# Patient Record
Sex: MALE | Race: WHITE | Employment: FULL TIME | URBAN - METROPOLITAN AREA
[De-identification: names, ages, dates, MRNs, and addresses within clinical notes are randomized per-mention and may not be internally consistent; named-entity substitution may affect disease eponyms.]

---

## 2017-05-25 ENCOUNTER — EMPLOYEE WELLNESS (OUTPATIENT)
Dept: OTHER | Age: 42
End: 2017-05-25

## 2017-05-25 LAB
CHOLESTEROL/HDL RATIO: 3.1
CHOLESTEROL: 172 MG/DL
GLUCOSE BLD-MCNC: 91 MG/DL (ref 70–99)
HDLC SERPL-MCNC: 55 MG/DL
LDL CHOLESTEROL: 108 MG/DL (ref 0–130)
PATIENT FASTING?: YES
TRIGL SERPL-MCNC: 44 MG/DL
VLDLC SERPL CALC-MCNC: NORMAL MG/DL (ref 1–30)

## 2018-01-12 ENCOUNTER — OFFICE VISIT (OUTPATIENT)
Dept: FAMILY MEDICINE CLINIC | Age: 43
End: 2018-01-12
Payer: COMMERCIAL

## 2018-01-12 VITALS
SYSTOLIC BLOOD PRESSURE: 110 MMHG | WEIGHT: 184.1 LBS | DIASTOLIC BLOOD PRESSURE: 74 MMHG | HEART RATE: 74 BPM | BODY MASS INDEX: 27.9 KG/M2 | OXYGEN SATURATION: 98 % | HEIGHT: 68 IN | TEMPERATURE: 98.6 F

## 2018-01-12 DIAGNOSIS — Z00.00 ANNUAL PHYSICAL EXAM: Primary | ICD-10-CM

## 2018-01-12 DIAGNOSIS — R41.840 POOR CONCENTRATION: ICD-10-CM

## 2018-01-12 DIAGNOSIS — E78.5 HYPERLIPIDEMIA, UNSPECIFIED HYPERLIPIDEMIA TYPE: ICD-10-CM

## 2018-01-12 DIAGNOSIS — Z11.4 ENCOUNTER FOR SCREENING FOR HIV: ICD-10-CM

## 2018-01-12 PROCEDURE — 99396 PREV VISIT EST AGE 40-64: CPT | Performed by: PHYSICIAN ASSISTANT

## 2018-01-12 ASSESSMENT — ENCOUNTER SYMPTOMS
CHEST TIGHTNESS: 0
EYE DISCHARGE: 0
SINUS PRESSURE: 0
COUGH: 0
ABDOMINAL PAIN: 0
VOMITING: 0
SHORTNESS OF BREATH: 0
NAUSEA: 0
DIARRHEA: 0
RHINORRHEA: 0
SORE THROAT: 0
EYE ITCHING: 0

## 2018-01-12 ASSESSMENT — PATIENT HEALTH QUESTIONNAIRE - PHQ9
2. FEELING DOWN, DEPRESSED OR HOPELESS: 1
SUM OF ALL RESPONSES TO PHQ QUESTIONS 1-9: 2
1. LITTLE INTEREST OR PLEASURE IN DOING THINGS: 1
SUM OF ALL RESPONSES TO PHQ9 QUESTIONS 1 & 2: 2

## 2018-01-12 NOTE — PROGRESS NOTES
[address]  [address] [place] [id number]  Dept: [phone]  Dept Fax: [phone]    Eli Calzada is a 40 y.o. male who presents today for his medical conditions/complaints as noted below. Eli Calzada is c/o of   Chief Complaint   Patient presents with    Hyperlipidemia      concerns-         HPI:     Hyperlipidemia   This is a chronic problem. He has no history of chronic renal disease, diabetes, hypothyroidism, liver disease or nephrotic syndrome. Pertinent negatives include no chest pain or shortness of breath. Mental Health Problem   The primary symptoms do not include dysphoric mood, delusions, hallucinations, bizarre behavior, disorganized speech, negative symptoms or somatic symptoms. Additional symptoms of the illness include attention impairment. Additional symptoms of the illness do not include anhedonia, insomnia, hypersomnia, appetite change, unexpected weight change, fatigue, agitation, psychomotor retardation, euphoric mood, headaches or abdominal pain. He does not admit to suicidal ideas. He does not have a plan to commit suicide. He does not contemplate harming himself. He has not already injured self. He does not contemplate injuring another person. He has not already  injured another person. No results found for: LABA1C          ( goal A1C is < 7)   No results found for: LABMICR  LDL Cholesterol (mg/dL)   Date Value   05/25/2017 108   06/12/2013 125 (H)       (goal LDL is <100)   No results found for: AST, ALT, BUN  BP Readings from Last 3 Encounters:   01/12/18 110/74   08/22/16 132/87   08/08/16 118/70          (goal 120/80)    History reviewed. No pertinent past medical history. Past Surgical History:   Procedure Laterality Date    VASECTOMY         History reviewed. No pertinent family history.     Social History   Substance Use Topics    Smoking status: Former Smoker    Smokeless tobacco: Former User    Alcohol use Yes Miguelina Roberto PA-C on 1/12/2018 at 10:53 AM

## 2018-01-16 ENCOUNTER — HOSPITAL ENCOUNTER (OUTPATIENT)
Age: 43
Setting detail: SPECIMEN
Discharge: HOME | End: 2018-01-16
Payer: COMMERCIAL

## 2018-01-16 DIAGNOSIS — E78.5 HYPERLIPIDEMIA, UNSPECIFIED HYPERLIPIDEMIA TYPE: ICD-10-CM

## 2018-01-16 DIAGNOSIS — Z00.00 ANNUAL PHYSICAL EXAM: ICD-10-CM

## 2018-01-16 DIAGNOSIS — R41.840 POOR CONCENTRATION: ICD-10-CM

## 2018-01-16 DIAGNOSIS — Z11.4 ENCOUNTER FOR SCREENING FOR HIV: ICD-10-CM

## 2018-01-16 LAB
ABSOLUTE EOS #: 0.04 K/UL (ref 0–0.44)
ABSOLUTE IMMATURE GRANULOCYTE: 0.05 K/UL (ref 0–0.3)
ABSOLUTE LYMPH #: 1.66 K/UL (ref 1.1–3.7)
ABSOLUTE MONO #: 0.4 K/UL (ref 0.1–1.2)
ALBUMIN SERPL-MCNC: 5.1 G/DL (ref 3.5–5.2)
ALBUMIN/GLOBULIN RATIO: 1.9 (ref 1–2.5)
ALP BLD-CCNC: 71 U/L (ref 40–129)
ALT SERPL-CCNC: 19 U/L (ref 5–41)
ANION GAP SERPL CALCULATED.3IONS-SCNC: 19 MMOL/L (ref 9–17)
AST SERPL-CCNC: 23 U/L
BASOPHILS # BLD: 1 % (ref 0–2)
BASOPHILS ABSOLUTE: 0.05 K/UL (ref 0–0.2)
BILIRUB SERPL-MCNC: 0.7 MG/DL (ref 0.3–1.2)
BUN BLDV-MCNC: 20 MG/DL (ref 6–20)
BUN/CREAT BLD: ABNORMAL (ref 9–20)
CALCIUM SERPL-MCNC: 9.3 MG/DL (ref 8.6–10.4)
CHLORIDE BLD-SCNC: 100 MMOL/L (ref 98–107)
CHOLESTEROL/HDL RATIO: 3.9
CHOLESTEROL: 215 MG/DL
CO2: 25 MMOL/L (ref 20–31)
CREAT SERPL-MCNC: 1.06 MG/DL (ref 0.7–1.2)
DIFFERENTIAL TYPE: ABNORMAL
EOSINOPHILS RELATIVE PERCENT: 1 % (ref 1–4)
GFR AFRICAN AMERICAN: >60 ML/MIN
GFR NON-AFRICAN AMERICAN: >60 ML/MIN
GFR SERPL CREATININE-BSD FRML MDRD: ABNORMAL ML/MIN/{1.73_M2}
GFR SERPL CREATININE-BSD FRML MDRD: ABNORMAL ML/MIN/{1.73_M2}
GLUCOSE BLD-MCNC: 76 MG/DL (ref 70–99)
HCT VFR BLD CALC: 46.4 % (ref 40.7–50.3)
HDLC SERPL-MCNC: 55 MG/DL
HEMOGLOBIN: 15.5 G/DL (ref 13–17)
HIV AG/AB: NONREACTIVE
IMMATURE GRANULOCYTES: 1 %
LDL CHOLESTEROL: 149 MG/DL (ref 0–130)
LYMPHOCYTES # BLD: 26 % (ref 24–43)
MCH RBC QN AUTO: 30.3 PG (ref 25.2–33.5)
MCHC RBC AUTO-ENTMCNC: 33.4 G/DL (ref 28.4–34.8)
MCV RBC AUTO: 90.8 FL (ref 82.6–102.9)
MONOCYTES # BLD: 6 % (ref 3–12)
NRBC AUTOMATED: 0 PER 100 WBC
PDW BLD-RTO: 11.9 % (ref 11.8–14.4)
PLATELET # BLD: 250 K/UL (ref 138–453)
PLATELET ESTIMATE: ABNORMAL
PMV BLD AUTO: 11.3 FL (ref 8.1–13.5)
POTASSIUM SERPL-SCNC: 4.3 MMOL/L (ref 3.7–5.3)
RBC # BLD: 5.11 M/UL (ref 4.21–5.77)
RBC # BLD: ABNORMAL 10*6/UL
SEG NEUTROPHILS: 65 % (ref 36–65)
SEGMENTED NEUTROPHILS ABSOLUTE COUNT: 4.11 K/UL (ref 1.5–8.1)
SODIUM BLD-SCNC: 144 MMOL/L (ref 135–144)
TOTAL PROTEIN: 7.8 G/DL (ref 6.4–8.3)
TRIGL SERPL-MCNC: 57 MG/DL
TSH SERPL DL<=0.05 MIU/L-ACNC: 1.96 MIU/L (ref 0.3–5)
VITAMIN D 25-HYDROXY: 30 NG/ML (ref 30–100)
VLDLC SERPL CALC-MCNC: ABNORMAL MG/DL (ref 1–30)
WBC # BLD: 6.3 K/UL (ref 3.5–11.3)
WBC # BLD: ABNORMAL 10*3/UL

## 2018-03-14 ENCOUNTER — HOSPITAL ENCOUNTER (OUTPATIENT)
Age: 43
Discharge: HOME | End: 2018-03-14
Payer: COMMERCIAL

## 2018-03-14 ENCOUNTER — OFFICE VISIT (OUTPATIENT)
Dept: BEHAVIORAL/MENTAL HEALTH CLINIC | Age: 43
End: 2018-03-14
Payer: COMMERCIAL

## 2018-03-14 VITALS
BODY MASS INDEX: 27.98 KG/M2 | SYSTOLIC BLOOD PRESSURE: 133 MMHG | HEART RATE: 104 BPM | DIASTOLIC BLOOD PRESSURE: 91 MMHG | WEIGHT: 184 LBS

## 2018-03-14 DIAGNOSIS — F33.0 MAJOR DEPRESSIVE DISORDER, RECURRENT EPISODE, MILD (HCC): ICD-10-CM

## 2018-03-14 DIAGNOSIS — F33.0 MAJOR DEPRESSIVE DISORDER, RECURRENT EPISODE, MILD (HCC): Primary | ICD-10-CM

## 2018-03-14 DIAGNOSIS — F41.1 GENERALIZED ANXIETY DISORDER: ICD-10-CM

## 2018-03-14 DIAGNOSIS — F10.11 ALCOHOL USE DISORDER, MILD, IN SUSTAINED REMISSION, ABUSE: ICD-10-CM

## 2018-03-14 LAB
AMPHETAMINE SCREEN URINE: NEGATIVE
BARBITURATE SCREEN URINE: NEGATIVE
BENZODIAZEPINE SCREEN, URINE: NEGATIVE
BUPRENORPHINE URINE: NORMAL
CANNABINOID SCREEN URINE: NEGATIVE
COCAINE METABOLITE, URINE: NEGATIVE
MDMA URINE: NORMAL
METHADONE SCREEN, URINE: NEGATIVE
METHAMPHETAMINE, URINE: NORMAL
OPIATES, URINE: NEGATIVE
OXYCODONE SCREEN URINE: NEGATIVE
PHENCYCLIDINE, URINE: NEGATIVE
PROPOXYPHENE, URINE: NORMAL
TEST INFORMATION: NORMAL
TRICYCLIC ANTIDEPRESSANTS, UR: NORMAL

## 2018-03-14 PROCEDURE — 80307 DRUG TEST PRSMV CHEM ANLYZR: CPT

## 2018-03-14 PROCEDURE — 90792 PSYCH DIAG EVAL W/MED SRVCS: CPT | Performed by: PSYCHIATRY & NEUROLOGY

## 2018-03-14 RX ORDER — MIRTAZAPINE 7.5 MG/1
7.5 TABLET, FILM COATED ORAL NIGHTLY
Qty: 30 TABLET | Refills: 0 | Status: SHIPPED | OUTPATIENT
Start: 2018-03-14 | End: 2018-03-28 | Stop reason: SDUPTHER

## 2018-03-14 ASSESSMENT — ENCOUNTER SYMPTOMS
EYES NEGATIVE: 1
RESPIRATORY NEGATIVE: 1
GASTROINTESTINAL NEGATIVE: 1

## 2018-03-14 NOTE — PROGRESS NOTES
Near to/above Desirable   130-159   Borderline      >159   Undesirable     Direct (measured) LDL and calculated LDL are not interchangeable tests. Chol/HDL Ratio   Date Value Ref Range Status   01/16/2018 3.9 <5 Final     Comment:              Triglycerides   Date Value Ref Range Status   01/16/2018 57 <150 mg/dL Final     Comment:        Triglyceride Guidelines:     <150   Desirable   150-199  Borderline   200-499  High     >499   Very high   Based on AHA Guidelines for fasting triglyceride, October 2012.      [employer] [address] [facility], [address] [place] [address] [phone]       VLDL   Date Value Ref Range Status   01/16/2018 NOT REPORTED 1 - 30 mg/dL Final       CBC with Differential:  WBC   Date Value Ref Range Status   01/16/2018 6.3 3.5 - 11.3 k/uL Final     RBC   Date Value Ref Range Status   01/16/2018 5.11 4.21 - 5.77 m/uL Final     Hemoglobin   Date Value Ref Range Status   01/16/2018 15.5 13.0 - 17.0 g/dL Final     Hematocrit   Date Value Ref Range Status   01/16/2018 46.4 40.7 - 50.3 % Final     MCV   Date Value Ref Range Status   01/16/2018 90.8 82.6 - 102.9 fL Final     MCH   Date Value Ref Range Status   01/16/2018 30.3 25.2 - 33.5 pg Final     MCHC   Date Value Ref Range Status   01/16/2018 33.4 28.4 - 34.8 g/dL Final     RDW   Date Value Ref Range Status   01/16/2018 11.9 11.8 - 14.4 % Final     Platelets   Date Value Ref Range Status   01/16/2018 250 138 - 453 k/uL Final     MPV   Date Value Ref Range Status   01/16/2018 11.3 8.1 - 13.5 fL Final     Differential Type   Date Value Ref Range Status   01/16/2018 NOT REPORTED  Final     Seg Neutrophils   Date Value Ref Range Status   01/16/2018 65 36 - 65 % Final     Lymphocytes   Date Value Ref Range Status   01/16/2018 26 24 - 43 % Final     Monocytes   Date Value Ref Range Status   01/16/2018 6 3 - 12 % Final     Eosinophils %   Date Value Ref Range Status   01/16/2018 1 1 - 4 % Final     Basophils   Date Value Ref Range Status   01/16/2018 1 screen today. Benefit/risk of no trt were discussed. 50% time spent in coordinating care / counseling. Support provided during this appointment. Recommended individual therapy. Patient may consider in future. His wife is a counselor and she is supportive of him. Return in about 2 weeks (around 3/28/2018). as scheduled, and as needed. In case of emergency advised pt to call local ER/Rescue Crisis. F/U with HENRIQUE Horton/MELIA as needed. Discussed less calorie and less fat diet and exercise as tolerated like walking. Discussed the adverse effects of alcohol and other street drugs along with the psychotropics. Discussed the SE of nicotine and caffeine in sleep disturbance and anxiety.     Electronically signed by Abby Daniels MD on 3/14/2018 at 1:33 PM

## 2018-03-20 VITALS — BODY MASS INDEX: 26.46 KG/M2 | WEIGHT: 174 LBS

## 2018-03-28 ENCOUNTER — OFFICE VISIT (OUTPATIENT)
Dept: BEHAVIORAL/MENTAL HEALTH CLINIC | Age: 43
End: 2018-03-28
Payer: COMMERCIAL

## 2018-03-28 VITALS
SYSTOLIC BLOOD PRESSURE: 125 MMHG | WEIGHT: 188 LBS | BODY MASS INDEX: 28.59 KG/M2 | DIASTOLIC BLOOD PRESSURE: 79 MMHG | HEART RATE: 93 BPM

## 2018-03-28 DIAGNOSIS — F33.0 MAJOR DEPRESSIVE DISORDER, RECURRENT EPISODE, MILD (HCC): Primary | ICD-10-CM

## 2018-03-28 DIAGNOSIS — F41.1 GENERALIZED ANXIETY DISORDER: ICD-10-CM

## 2018-03-28 DIAGNOSIS — F10.11 MILD ALCOHOL ABUSE IN SUSTAINED REMISSION: ICD-10-CM

## 2018-03-28 PROCEDURE — 99214 OFFICE O/P EST MOD 30 MIN: CPT | Performed by: PSYCHIATRY & NEUROLOGY

## 2018-03-28 RX ORDER — MIRTAZAPINE 7.5 MG/1
7.5 TABLET, FILM COATED ORAL NIGHTLY
Qty: 30 TABLET | Refills: 0 | Status: SHIPPED | OUTPATIENT
Start: 2018-03-28 | End: 2018-05-09 | Stop reason: SDUPTHER

## 2018-03-28 ASSESSMENT — ENCOUNTER SYMPTOMS
RESPIRATORY NEGATIVE: 1
EYES NEGATIVE: 1
GASTROINTESTINAL NEGATIVE: 1

## 2018-05-16 ENCOUNTER — OFFICE VISIT (OUTPATIENT)
Dept: BEHAVIORAL/MENTAL HEALTH CLINIC | Age: 43
End: 2018-05-16
Payer: COMMERCIAL

## 2018-05-16 VITALS
WEIGHT: 186 LBS | RESPIRATION RATE: 17 BRPM | SYSTOLIC BLOOD PRESSURE: 109 MMHG | HEART RATE: 86 BPM | BODY MASS INDEX: 28.28 KG/M2 | DIASTOLIC BLOOD PRESSURE: 74 MMHG

## 2018-05-16 DIAGNOSIS — F10.11 MILD ALCOHOL ABUSE IN SUSTAINED REMISSION: ICD-10-CM

## 2018-05-16 DIAGNOSIS — F33.0 MAJOR DEPRESSIVE DISORDER, RECURRENT EPISODE, MILD (HCC): Primary | ICD-10-CM

## 2018-05-16 DIAGNOSIS — F41.1 GENERALIZED ANXIETY DISORDER: ICD-10-CM

## 2018-05-16 PROCEDURE — 99214 OFFICE O/P EST MOD 30 MIN: CPT | Performed by: PSYCHIATRY & NEUROLOGY

## 2018-05-16 RX ORDER — MIRTAZAPINE 15 MG/1
15 TABLET, FILM COATED ORAL NIGHTLY
Qty: 30 TABLET | Refills: 2 | Status: SHIPPED | OUTPATIENT
Start: 2018-05-16 | End: 2018-08-14 | Stop reason: SDUPTHER

## 2018-05-16 ASSESSMENT — ENCOUNTER SYMPTOMS
RESPIRATORY NEGATIVE: 1
GASTROINTESTINAL NEGATIVE: 1
EYES NEGATIVE: 1

## 2018-05-17 ENCOUNTER — EMPLOYEE WELLNESS (OUTPATIENT)
Dept: OTHER | Age: 43
End: 2018-05-17

## 2018-05-17 LAB
CHOLESTEROL/HDL RATIO: 4.6
CHOLESTEROL: 185 MG/DL
GLUCOSE BLD-MCNC: 91 MG/DL (ref 70–99)
HDLC SERPL-MCNC: 40 MG/DL
LDL CHOLESTEROL: 119 MG/DL (ref 0–130)
PATIENT FASTING?: YES
TRIGL SERPL-MCNC: 130 MG/DL
VLDLC SERPL CALC-MCNC: ABNORMAL MG/DL (ref 1–30)

## 2018-05-21 VITALS — WEIGHT: 185 LBS | BODY MASS INDEX: 28.13 KG/M2

## 2018-08-08 ENCOUNTER — OFFICE VISIT (OUTPATIENT)
Dept: BEHAVIORAL/MENTAL HEALTH CLINIC | Age: 43
End: 2018-08-08
Payer: COMMERCIAL

## 2018-08-08 DIAGNOSIS — F33.0 MAJOR DEPRESSIVE DISORDER, RECURRENT EPISODE, MILD (HCC): Primary | ICD-10-CM

## 2018-08-08 DIAGNOSIS — F41.1 GENERALIZED ANXIETY DISORDER: ICD-10-CM

## 2018-08-08 PROCEDURE — 90791 PSYCH DIAGNOSTIC EVALUATION: CPT | Performed by: PSYCHOLOGIST

## 2018-08-08 NOTE — PROGRESS NOTES
to change, Established rapport and [place]-setting to identify pt's primary goals for [facility] visit / overall health      Pt Behavioral Change Plan:   1. Please complete Patient Health Questionnaire and bring this with you to your next consultation. 2. If you need to reschedule or cancel please call [phone], option 3. If you need to speak directly to Dr. Cristóbal Arizmendi call [phone].  3. What is your guess about that \"normal\" word? What things ground \"normal\" people? 4. Ways to measure success/recovery:  A:  · Distress: How much does it bother me? · Intensity: How strong is the symptom? · Duration: How long does the symptom last?  · Frequency: How often does the symptom happen? · Dysfunction: How much does this symptom stop or decrease my ability to function? B: Another way is to ask yourself do I feel: better, worse or the same?   C: Another way is to ask yourself, was that healthy/unhealthy, effective/ineffective, safe/unsafe. 5. See handout on the process of change. Where have you struggle or stalled out in your process? 6. Follow up in 2 weeks. Diagnosis:  The primary encounter diagnosis was Major depressive disorder, recurrent episode, mild ([address] A diagnosis of Generalized anxiety disorder was also pertinent to this visit. No past medical history on file. History:    Medications:   Current Outpatient Prescriptions   Medication Sig Dispense Refill    mirtazapine (REMERON) 15 MG tablet Take 1 tablet by mouth nightly 30 tablet 2     No current facility-administered medications for this visit. Social History:   Social History     Social History    Marital status:      Spouse name: N/A    Number of children: N/A    Years of education: N/A     Occupational History    Not on file. Social History Main Topics    Smoking status: Former Smoker    Smokeless tobacco: Former User    Alcohol use Yes      Comment: decreased to 1 beer a wk; NICHOLAS--1 wk ago. No DUI / DT    Drug use:  No  Sexual activity: Not on file     Other Topics Concern    Not on file     Social History Narrative    No narrative on file       TOBACCO:   reports that he has quit smoking. He has quit using smokeless tobacco.  ETOH:   reports that he drinks alcohol. Family History:   No family history on file.

## 2018-08-08 NOTE — PATIENT INSTRUCTIONS
1. Please complete Patient Health Questionnaire and bring this with you to your next consultation. 2. If you need to reschedule or cancel please call [phone], option 3. If you need to speak directly to Dr. Frantz Eng call [phone].  3. What is your guess about that \"normal\" word? What things ground \"normal\" people? 4. Ways to measure success/recovery:  A:  · Distress: How much does it bother me? · Intensity: How strong is the symptom? · Duration: How long does the symptom last?  · Frequency: How often does the symptom happen? · Dysfunction: How much does this symptom stop or decrease my ability to function? B: Another way is to ask yourself do I feel: better, worse or the same?   C: Another way is to ask yourself, was that healthy/unhealthy, effective/ineffective, safe/unsafe. 5. See handout on the process of change. Where have you struggle or stalled out in your process? 6. Follow up in 2 weeks.

## 2018-08-14 ENCOUNTER — OFFICE VISIT (OUTPATIENT)
Dept: BEHAVIORAL/MENTAL HEALTH CLINIC | Age: 43
End: 2018-08-14
Payer: COMMERCIAL

## 2018-08-14 VITALS
SYSTOLIC BLOOD PRESSURE: 114 MMHG | RESPIRATION RATE: 18 BRPM | WEIGHT: 181 LBS | BODY MASS INDEX: 27.52 KG/M2 | HEART RATE: 88 BPM | DIASTOLIC BLOOD PRESSURE: 80 MMHG

## 2018-08-14 DIAGNOSIS — F41.1 GENERALIZED ANXIETY DISORDER: ICD-10-CM

## 2018-08-14 DIAGNOSIS — F10.11 MILD ALCOHOL ABUSE IN SUSTAINED REMISSION: ICD-10-CM

## 2018-08-14 DIAGNOSIS — F33.0 MAJOR DEPRESSIVE DISORDER, RECURRENT EPISODE, MILD (HCC): Primary | ICD-10-CM

## 2018-08-14 PROCEDURE — 99214 OFFICE O/P EST MOD 30 MIN: CPT | Performed by: PSYCHIATRY & NEUROLOGY

## 2018-08-14 RX ORDER — MIRTAZAPINE 15 MG/1
15 TABLET, FILM COATED ORAL NIGHTLY
Qty: 30 TABLET | Refills: 0 | Status: SHIPPED | OUTPATIENT
Start: 2018-08-14 | End: 2018-09-11 | Stop reason: SDUPTHER

## 2018-08-14 RX ORDER — BUPROPION HYDROCHLORIDE 100 MG/1
TABLET, EXTENDED RELEASE ORAL
Qty: 30 TABLET | Refills: 0 | Status: SHIPPED | OUTPATIENT
Start: 2018-08-14 | End: 2018-09-11 | Stop reason: SDUPTHER

## 2018-08-14 ASSESSMENT — ENCOUNTER SYMPTOMS
GASTROINTESTINAL NEGATIVE: 1
EYES NEGATIVE: 1
RESPIRATORY NEGATIVE: 1

## 2018-08-14 NOTE — PROGRESS NOTES
[address] Department of Psychiatry  Outpatient Services    Progress Note      Chief Complaint   Patient presents with    Discuss Medications         History obtained from: patient, medical record, outpatient clinical staff. Patient provided informed consent for the behavioral program.  Discussed the limits of confidentiality like abuse reporting, alcohol and substance abuse and suicide intervention etc.  Patient indicated understanding. Subjective:  Patient said Remeron is beneficial. He takes Remeron around 5 PM and feels drowsy about 2 hours later and if he takes late, he is not able to get up in the morning. Patient said he is a morning person and wakes up around 3:30 AM to go for jogging up to 8 miles per day. Patient has mild to moderate depression and anxiety due to ongoing life stresses. Maverickjennioksana Jaime He is remodeling his home to sell it and has financial limitation. Patient denied any sexual side effects. After taking Remeron, he noticed improvement in his mood symptoms. Pt has no mood swings   Pt sleeps 6 hours. He has had no difficulty in initiation/maintenance of sleep. He feels His  sleep is satisfactory. His appetite has been satisfactory. Patient denied any craving. He lost a few pounds. His family is supportive. He lives with His wife and 2 children. .  He has a full-time job. 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 He gets a long with:  [x] others  [x] family members  [x] co workers. Review of Systems:  Review of Systems   Constitutional: Negative. Eyes: Negative. Respiratory: Negative. Cardiovascular: Negative. Gastrointestinal: Negative. Genitourinary: Negative. Pt denied sexual side effects. Musculoskeletal: Negative. Skin: Negative. Neurological: Negative. Endo/Heme/Allergies: Negative. Psychiatric/Behavioral:        AS above. Allergies:  Patient has no known allergies.     AoD:  Social History   Substance Use Topics    Smoking status: Former Smoker    Smokeless tobacco: Former User    Alcohol use Yes      Comment: decreased to 1 beer/wk off and on ; NICHOLAS--12 days ago. No DUI / DT      History   Drug Use No         Caffeine:   Patient drinks 2 cups of regular coffee in the morning. Vitals:  Temp Readings from Last 1 Encounters:   01/12/18 98.6 °F (37 °C) (Oral)     Wt Readings from Last 1 Encounters:   08/14/18 181 lb (82.1 kg)     BP Readings from Last 1 Encounters:   08/14/18 114/80     Pulse Readings from Last 1 Encounters:   08/14/18 88     Estimated body mass index is 27.52 kg/m² as calculated from the following:    Height as of 1/12/18: 5' 8\" (1.727 m). Weight as of this encounter: 181 lb (82.1 kg). Mental Status Examination:    Level of consciousness:  Within normal limits  Appearance: Appropriately dressed for the weather, seated in chair, with good grooming, has a beard, overweight  Behavior: No abnormalities noted  Attitude toward examiner:  Cooperative, attentive, good eye contact  Speech:  spontaneous, normal rate, normal volume and well articulated  Mood: Depressed   Affect: Stable. Thought processes:  linear, goal directed and coherent  Thought content:  Pt denied suicidal and homicidal thoughts during this appt. Auditory hallucination:Pt denied auditory hallucination. Pt does not seem to respond to any internal stimuli such as hallucination during this appt. Delusions:  no evidence of delusions  Perceptual Disturbance:  denies any perceptual disturbance  Cognition:  Intact  Memory: age appropriate  Insight & Judgement: fair  Gait: Normal  Abnormal body movements: None    Today's Medications List:  Current Outpatient Prescriptions:     mirtazapine (REMERON) 15 MG tablet, Take 1 tablet by mouth nightly, Disp: 30 tablet, Rfl: 0    buPROPion (WELLBUTRIN SR) 100 MG extended release tablet, Take 1 tab in AM---Start, Disp: 30 tablet, Rfl: 0    Diagnoses DSM V    Visit Diagnoses:  1.  Major depressive disorder, [facility]    2. Generalized anxiety disorder    3. 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 Discussed other alternatives including Lexapro, Wellbutrin SR, Viibryd etc.  Patient agreed to try Wellbutrin SR. Recommended  to continue Remeron and start Wellbutrin  mg in AM.  Discussed Side effects off of Wellbutrin SR including anxiety, insomnia, decreased appetite, GI upset symptoms, decreased seizure threshold etc.    Also discussed the heat related illness in psychotropic medication users and advised him  to take precautions to avoid heatstroke and heat exhaustion. Benefit/risk of no trt were discussed . 50% time spent in coordinating care / counseling. Continue individual therapy with the Dr. Marlin Whipple, PhD at [facility]. Return in about 4 weeks (around 9/11/2018). as scheduled, and as needed. I told patient the outpatient psychiatric department will be closed in October 2018 and advised him to follow-up with his family physician, or private psychiatrist or at [facility] Like [place] Kasia., Ольга Tim and jonatan etc. A list of the referral sources was given to the patient. In case of emergency advised pt to call local ER/Rescue Crisis/BAC     F/U with HENRIQUE Posadas/MELIA as needed. Discussed the beneficial effects of exercise in mood and anxiety symptoms. Patient said he has been jogging routinely up to 8 miles per day. Discussed the adverse effects of alcohol and other street drugs along with the psychotropvoiced understanding of instructions given. I also told Wellbutrin SR along with alcohol can lower the seizure threshold. Discussed the SE of caffeine and nicotine in anxiety and sleep disturbance. .     Electronically signed by Los Bond MD on 8/14/2018 at 9:38 AM

## 2018-08-23 ENCOUNTER — HOSPITAL ENCOUNTER (OUTPATIENT)
Age: 43
Discharge: HOME | End: 2018-08-23

## 2018-08-23 LAB — HBV SURFACE AB TITR SER: <3.5 MIU/ML

## 2018-08-23 PROCEDURE — 86317 IMMUNOASSAY INFECTIOUS AGENT: CPT

## 2018-08-23 PROCEDURE — 36415 COLL VENOUS BLD VENIPUNCTURE: CPT

## 2018-09-11 ENCOUNTER — OFFICE VISIT (OUTPATIENT)
Dept: FAMILY MEDICINE CLINIC | Age: 43
End: 2018-09-11
Payer: COMMERCIAL

## 2018-09-11 VITALS
DIASTOLIC BLOOD PRESSURE: 82 MMHG | HEIGHT: 68 IN | WEIGHT: 179 LBS | SYSTOLIC BLOOD PRESSURE: 123 MMHG | TEMPERATURE: 98.3 F | HEART RATE: 83 BPM | BODY MASS INDEX: 27.13 KG/M2 | OXYGEN SATURATION: 98 %

## 2018-09-11 DIAGNOSIS — Z13.1 SCREENING FOR DIABETES MELLITUS: ICD-10-CM

## 2018-09-11 DIAGNOSIS — Z23 NEED FOR PROPHYLACTIC VACCINATION AGAINST STREPTOCOCCUS PNEUMONIAE (PNEUMOCOCCUS): ICD-10-CM

## 2018-09-11 DIAGNOSIS — F41.1 GENERALIZED ANXIETY DISORDER: ICD-10-CM

## 2018-09-11 DIAGNOSIS — R76.11 POSITIVE PPD: ICD-10-CM

## 2018-09-11 DIAGNOSIS — F33.0 MAJOR DEPRESSIVE DISORDER, RECURRENT EPISODE, MILD (HCC): Primary | ICD-10-CM

## 2018-09-11 DIAGNOSIS — E78.5 HYPERLIPIDEMIA, UNSPECIFIED HYPERLIPIDEMIA TYPE: ICD-10-CM

## 2018-09-11 DIAGNOSIS — Z13.29 SCREENING FOR THYROID DISORDER: ICD-10-CM

## 2018-09-11 PROCEDURE — 90471 IMMUNIZATION ADMIN: CPT | Performed by: PHYSICIAN ASSISTANT

## 2018-09-11 PROCEDURE — 99396 PREV VISIT EST AGE 40-64: CPT | Performed by: PHYSICIAN ASSISTANT

## 2018-09-11 PROCEDURE — 90732 PPSV23 VACC 2 YRS+ SUBQ/IM: CPT | Performed by: PHYSICIAN ASSISTANT

## 2018-09-11 RX ORDER — BUPROPION HYDROCHLORIDE 100 MG/1
TABLET, EXTENDED RELEASE ORAL
Qty: 90 TABLET | Refills: 1 | Status: SHIPPED | OUTPATIENT
Start: 2018-09-11 | End: 2018-12-19 | Stop reason: SDUPTHER

## 2018-09-11 RX ORDER — MIRTAZAPINE 15 MG/1
15 TABLET, FILM COATED ORAL NIGHTLY
Qty: 90 TABLET | Refills: 1 | Status: SHIPPED | OUTPATIENT
Start: 2018-09-11 | End: 2019-02-12 | Stop reason: SDUPTHER

## 2018-09-11 ASSESSMENT — ENCOUNTER SYMPTOMS
CHEST TIGHTNESS: 0
NAUSEA: 0
RHINORRHEA: 0
ABDOMINAL PAIN: 0
EYE ITCHING: 0
VOMITING: 0
EYE DISCHARGE: 0
SORE THROAT: 0
DIARRHEA: 0
SINUS PRESSURE: 0
SHORTNESS OF BREATH: 0
COUGH: 0

## 2018-09-11 NOTE — PROGRESS NOTES
from Last 3 Encounters:   09/11/18 123/82   08/14/18 114/80   05/16/18 109/74          (goal 120/80)    No past medical history on file. Past Surgical History:   Procedure Laterality Date    VASECTOMY         No family history on file. Social History   Substance Use Topics    Smoking status: Former Smoker    Smokeless tobacco: Former User    Alcohol use Yes      Comment: decreased to 1 beer/wk off and on ; NICHOLAS--12 days ago. No DUI / DT      Current Outpatient Prescriptions   Medication Sig Dispense Refill    mirtazapine (REMERON) 15 MG tablet Take 1 tablet by mouth nightly 90 tablet 1    buPROPion (WELLBUTRIN SR) 100 MG extended release tablet Take 1 tab in AM---Start 90 tablet 1     No current facility-administered medications for this visit. No Known Allergies    Health Maintenance   Topic Date Due    Flu vaccine (1) 09/01/2018    Lipid screen  05/17/2023    DTaP/Tdap/Td vaccine (2 - Td) 08/08/2026    Pneumococcal med risk  Completed    HIV screen  Completed       Subjective:      Review of Systems   Constitutional: Negative for appetite change, chills, diaphoresis, fatigue, fever and unexpected weight change. HENT: Negative for congestion, ear discharge, ear pain, postnasal drip, rhinorrhea, sinus pressure and sore throat. Eyes: Negative for discharge and itching. Respiratory: Negative for cough, chest tightness and shortness of breath. Cardiovascular: Negative for chest pain, palpitations and leg swelling. Gastrointestinal: Negative for abdominal pain, diarrhea, nausea and vomiting. Genitourinary: Negative for dysuria and frequency. Musculoskeletal: Negative for neck pain and neck stiffness. Skin: Negative for rash. Neurological: Negative for dizziness, focal weakness, weakness, light-headedness, numbness and headaches. Psychiatric/Behavioral: Negative for agitation, dysphoric mood and hallucinations. The patient does not have insomnia.     All other systems reviewed and STANDARD (2 VW)   4. Hyperlipidemia, unspecified hyperlipidemia type  Lipid Panel   5. BMI 27.0-27.9,adult  CBC Auto Differential    Comprehensive Metabolic Panel    TSH with Reflex   6. Generalized anxiety disorder  TSH with Reflex    Vitamin D 25 Hydroxy   7. Screening for diabetes mellitus  Comprehensive Metabolic Panel   8. Screening for thyroid disorder  TSH with Reflex             Plan:      No Follow-up on file. Orders Placed This Encounter   Procedures    XR CHEST STANDARD (2 VW)     Standing Status:   Future     Standing Expiration Date:   9/11/2019    Pneumococcal polysaccharide vaccine 23-valent PPSV23    CBC Auto Differential     Standing Status:   Future     Standing Expiration Date:   9/11/2019    Comprehensive Metabolic Panel     Standing Status:   Future     Standing Expiration Date:   9/11/2019    Lipid Panel     Standing Status:   Future     Standing Expiration Date:   9/11/2019     Order Specific Question:   Is Patient Fasting?/# of Hours     Answer:   yes    TSH with Reflex     Standing Status:   Future     Standing Expiration Date:   9/11/2019    Vitamin D 25 Hydroxy     Standing Status:   Future     Standing Expiration Date:   9/11/2019     Orders Placed This Encounter   Medications    mirtazapine (REMERON) 15 MG tablet     Sig: Take 1 tablet by mouth nightly     Dispense:  90 tablet     Refill:  1    buPROPion (WELLBUTRIN SR) 100 MG extended release tablet     Sig: Take 1 tab in AM---Start     Dispense:  90 tablet     Refill:  1        Anxiety / depression - Was seeing psych, however, psych is leaving. Currently taking wellbutrin and remeron. Tolerating very well. No SI or HI. Will take over meds. F/u q 6 mo or sooner for any mood change. Cont balanced diet and exercise. HLD - H/o. Cont diet and exercise. Labs yearly. Previous TB tx - needs yrly xray. Ordered. Balanced diet and routine exercise encouraged. Multivitamin with vitamin D daily encouraged.     Good

## 2018-09-18 ENCOUNTER — HOSPITAL ENCOUNTER (OUTPATIENT)
Dept: GENERAL RADIOLOGY | Age: 43
Discharge: HOME | End: 2018-09-20
Payer: COMMERCIAL

## 2018-09-18 ENCOUNTER — HOSPITAL ENCOUNTER (OUTPATIENT)
Age: 43
Discharge: HOME | End: 2018-09-20
Payer: COMMERCIAL

## 2018-09-18 DIAGNOSIS — R76.11 POSITIVE PPD: ICD-10-CM

## 2018-09-18 PROCEDURE — 71046 X-RAY EXAM CHEST 2 VIEWS: CPT

## 2018-12-19 RX ORDER — BUPROPION HYDROCHLORIDE 100 MG/1
TABLET, EXTENDED RELEASE ORAL
Qty: 90 TABLET | Refills: 1 | Status: SHIPPED | OUTPATIENT
Start: 2018-12-19 | End: 2019-04-04 | Stop reason: SDUPTHER

## 2018-12-19 NOTE — TELEPHONE ENCOUNTER
Pended to print for pt. Please Approve or Refuse.   Send to Pharmacy per Pt's Request:      Next Visit Date:  2/11/2019   Last Visit Date: 9/11/2018    No results found for: LABA1C          ( goal A1C is < 7)   BP Readings from Last 3 Encounters:   09/11/18 123/82   08/14/18 114/80   05/16/18 109/74          (goal 120/80)  BUN   Date Value Ref Range Status   01/16/2018 20 6 - 20 mg/dL Final     CREATININE   Date Value Ref Range Status   01/16/2018 1.06 0.70 - 1.20 mg/dL Final     Potassium   Date Value Ref Range Status   01/16/2018 4.3 3.7 - 5.3 mmol/L Final

## 2019-02-12 RX ORDER — MIRTAZAPINE 15 MG/1
15 TABLET, FILM COATED ORAL NIGHTLY
Qty: 90 TABLET | Refills: 1 | Status: SHIPPED | OUTPATIENT
Start: 2019-02-12 | End: 2019-07-23 | Stop reason: SDUPTHER

## 2019-07-18 RX ORDER — BUPROPION HYDROCHLORIDE 100 MG/1
TABLET, EXTENDED RELEASE ORAL
Qty: 90 TABLET | Refills: 0 | Status: CANCELLED | OUTPATIENT
Start: 2019-07-18

## 2019-07-18 NOTE — TELEPHONE ENCOUNTER
Last visit: 2018  Last Med refill: 6/2019  Does patient have enough medication for 72 hours: No:  Patient has not been seen will call to schedule    Next Visit Date:  No future appointments.     Health Maintenance   Topic Date Due    Flu vaccine (1) 09/01/2019    Lipid screen  05/17/2023    DTaP/Tdap/Td vaccine (2 - Td) 08/08/2026    Pneumococcal 0-64 years Vaccine  Completed    HIV screen  Completed       No results found for: LABA1C          ( goal A1C is < 7)   No results found for: LABMICR  LDL Cholesterol (mg/dL)   Date Value   05/17/2018 119   01/16/2018 149 (H)       (goal LDL is <100)   AST (U/L)   Date Value   01/16/2018 23     ALT (U/L)   Date Value   01/16/2018 19     BUN (mg/dL)   Date Value   01/16/2018 20     BP Readings from Last 3 Encounters:   09/11/18 123/82   08/14/18 114/80   05/16/18 109/74          (goal 120/80)    All Future Testing planned in [facility]  Lab Frequency Next Occurrence   CBC Auto Differential Once 09/11/2019   Comprehensive Metabolic Panel Once 80/37/8683   Lipid Panel Once 09/11/2019   TSH with Reflex Once 09/11/2019   Vitamin D 25 Hydroxy Once 09/11/2019               Patient Active Problem List:     Folliculitis     Hyperlipidemia     Strep pharyngitis     Elevated cholesterol     BMI 27.0-27.9,adult     Major depressive disorder, recurrent episode, mild (HCC)     Generalized anxiety disorder

## 2019-07-23 ENCOUNTER — OFFICE VISIT (OUTPATIENT)
Dept: FAMILY MEDICINE CLINIC | Age: 44
End: 2019-07-23
Payer: COMMERCIAL

## 2019-07-23 VITALS
HEIGHT: 69 IN | OXYGEN SATURATION: 99 % | BODY MASS INDEX: 27.55 KG/M2 | TEMPERATURE: 96.9 F | HEART RATE: 67 BPM | DIASTOLIC BLOOD PRESSURE: 89 MMHG | SYSTOLIC BLOOD PRESSURE: 127 MMHG | WEIGHT: 186 LBS

## 2019-07-23 DIAGNOSIS — E78.5 HYPERLIPIDEMIA, UNSPECIFIED HYPERLIPIDEMIA TYPE: ICD-10-CM

## 2019-07-23 DIAGNOSIS — F41.1 GENERALIZED ANXIETY DISORDER: Primary | ICD-10-CM

## 2019-07-23 PROCEDURE — 99213 OFFICE O/P EST LOW 20 MIN: CPT | Performed by: PHYSICIAN ASSISTANT

## 2019-07-23 RX ORDER — BUPROPION HYDROCHLORIDE 100 MG/1
TABLET, EXTENDED RELEASE ORAL
Qty: 90 TABLET | Refills: 1 | Status: SHIPPED | OUTPATIENT
Start: 2019-07-23 | End: 2019-11-01 | Stop reason: SDUPTHER

## 2019-07-23 RX ORDER — MIRTAZAPINE 15 MG/1
15 TABLET, FILM COATED ORAL NIGHTLY
Qty: 90 TABLET | Refills: 1 | Status: SHIPPED | OUTPATIENT
Start: 2019-07-23 | End: 2019-10-31 | Stop reason: SDUPTHER

## 2019-07-23 ASSESSMENT — ENCOUNTER SYMPTOMS
SORE THROAT: 0
RHINORRHEA: 0
VOMITING: 0
DIARRHEA: 0
COUGH: 0
NAUSEA: 0
EYE ITCHING: 0
SINUS PRESSURE: 0
CHEST TIGHTNESS: 0
SHORTNESS OF BREATH: 0
EYE DISCHARGE: 0
VISUAL CHANGE: 0
ABDOMINAL PAIN: 0

## 2019-07-23 NOTE — PROGRESS NOTES
[address]  [address] [place] [id number]  Dept: [phone]  Dept Fax: [phone]    Esperanza Rinne is a 38 y.o. male who presents today for his medical conditions/complaints as noted below. Esperanza Rinne is c/o of   Chief Complaint   Patient presents with    Medication Refill         HPI:     Hyperlipidemia   This is a chronic problem. He has no history of chronic renal disease, diabetes, hypothyroidism, liver disease, obesity or nephrotic syndrome. Pertinent negatives include no chest pain, focal weakness, leg pain, myalgias or shortness of breath. Compliance problems include adherence to exercise. Risk factors for coronary artery disease include male sex. Mental Health Problem   The primary symptoms do not include dysphoric mood, delusions, hallucinations, bizarre behavior, disorganized speech, negative symptoms or somatic symptoms. Additional symptoms of the illness include attention impairment. Additional symptoms of the illness do not include anhedonia, insomnia, hypersomnia, appetite change, unexpected weight change, fatigue, agitation, psychomotor retardation, feelings of worthlessness, euphoric mood, increased goal-directed activity, flight of ideas, inflated self-esteem, decreased need for sleep, distractible, poor judgment, visual change, headaches or abdominal pain. He does not admit to suicidal ideas. He does not have a plan to commit suicide. He does not contemplate harming himself. He has not already injured self. He does not contemplate injuring another person. He has not already  injured another person.        No results found for: LABA1C          ( goal A1C is < 7)   No results found for: LABMICR  LDL Cholesterol (mg/dL)   Date Value   05/17/2018 119   01/16/2018 149 (H)   05/25/2017 108       (goal LDL is <100)   AST (U/L)   Date Value   01/16/2018 23     ALT (U/L)   Date Value   01/16/2018 19     BUN (mg/dL)   Date Value   01/16/2018 20 have insomnia. All other systems reviewed and are negative. Objective:     Physical Exam   Constitutional: He is oriented to person, place, and time. He appears well-developed and well-nourished. No distress. /74   Pulse 74   Temp 98.6 °F (37 °C) (Oral)   Ht 5' 8\" (1.727 m)   Wt 184 lb 1.6 oz (83.5 kg)   SpO2 98%   BMI 27.99 kg/m²      HENT:   Head: Normocephalic and atraumatic. Right Ear: External ear normal.   Left Ear: External ear normal.   Nose: Nose normal.   Mouth/Throat: Oropharynx is clear and moist.   Eyes: Pupils are equal, round, and reactive to light. Conjunctivae and EOM are normal. Right eye exhibits no discharge. Left eye exhibits no discharge. No scleral icterus. Neck: Normal range of motion. Neck supple. No tracheal deviation present. No thyromegaly present. Cardiovascular: Normal rate, regular rhythm and normal heart sounds. Exam reveals no gallop and no friction rub. No murmur heard. Pulmonary/Chest: Effort normal and breath sounds normal. No stridor. No respiratory distress. He has no wheezes. He has no rales. He exhibits no tenderness. Abdominal: Soft. Bowel sounds are normal. He exhibits no distension. There is no tenderness. There is no rebound and no guarding. Musculoskeletal: He exhibits no edema. Neurological: He is alert and oriented to person, place, and time. Gait normal.   Skin: Skin is warm and dry. No rash noted. He is not diaphoretic. Psychiatric: He has a normal mood and affect. His affect is not inappropriate. Nursing note and vitals reviewed. /89   Pulse 67   Temp 96.9 °F (36.1 °C) (Oral)   Ht 5' 9\" (1.753 m)   Wt 186 lb (84.4 kg)   SpO2 99%   BMI 27.47 kg/m²     Assessment:       Diagnosis Orders   1. Generalized anxiety disorder     2. BMI 27.0-27.9,adult     3. Hyperlipidemia, unspecified hyperlipidemia type               Plan:      No follow-ups on file.     No orders of the defined types were placed in this

## 2019-09-10 ENCOUNTER — HOSPITAL ENCOUNTER (OUTPATIENT)
Age: 44
Discharge: HOME | End: 2019-09-10
Payer: COMMERCIAL

## 2019-09-10 DIAGNOSIS — Z13.1 SCREENING FOR DIABETES MELLITUS: ICD-10-CM

## 2019-09-10 DIAGNOSIS — Z13.29 SCREENING FOR THYROID DISORDER: ICD-10-CM

## 2019-09-10 DIAGNOSIS — F41.1 GENERALIZED ANXIETY DISORDER: ICD-10-CM

## 2019-09-10 DIAGNOSIS — E78.5 HYPERLIPIDEMIA, UNSPECIFIED HYPERLIPIDEMIA TYPE: ICD-10-CM

## 2019-09-10 DIAGNOSIS — F33.0 MAJOR DEPRESSIVE DISORDER, RECURRENT EPISODE, MILD (HCC): ICD-10-CM

## 2019-09-10 LAB
ABSOLUTE EOS #: 0.1 K/UL (ref 0–0.44)
ABSOLUTE IMMATURE GRANULOCYTE: <0.03 K/UL (ref 0–0.3)
ABSOLUTE LYMPH #: 2.02 K/UL (ref 1.1–3.7)
ABSOLUTE MONO #: 0.48 K/UL (ref 0.1–1.2)
ALBUMIN SERPL-MCNC: 4.5 G/DL (ref 3.5–5.2)
ALBUMIN/GLOBULIN RATIO: 1.8 (ref 1–2.5)
ALP BLD-CCNC: 58 U/L (ref 40–129)
ALT SERPL-CCNC: 17 U/L (ref 5–41)
ANION GAP SERPL CALCULATED.3IONS-SCNC: 10 MMOL/L (ref 9–17)
AST SERPL-CCNC: 15 U/L
BASOPHILS # BLD: 1 % (ref 0–2)
BASOPHILS ABSOLUTE: 0.04 K/UL (ref 0–0.2)
BILIRUB SERPL-MCNC: 0.27 MG/DL (ref 0.3–1.2)
BUN BLDV-MCNC: 13 MG/DL (ref 6–20)
BUN/CREAT BLD: ABNORMAL (ref 9–20)
CALCIUM SERPL-MCNC: 9.5 MG/DL (ref 8.6–10.4)
CHLORIDE BLD-SCNC: 106 MMOL/L (ref 98–107)
CHOLESTEROL/HDL RATIO: 5.5
CHOLESTEROL: 224 MG/DL
CO2: 27 MMOL/L (ref 20–31)
CREAT SERPL-MCNC: 1.11 MG/DL (ref 0.7–1.2)
DIFFERENTIAL TYPE: NORMAL
EOSINOPHILS RELATIVE PERCENT: 2 % (ref 1–4)
GFR AFRICAN AMERICAN: >60 ML/MIN
GFR NON-AFRICAN AMERICAN: >60 ML/MIN
GFR SERPL CREATININE-BSD FRML MDRD: ABNORMAL ML/MIN/{1.73_M2}
GFR SERPL CREATININE-BSD FRML MDRD: ABNORMAL ML/MIN/{1.73_M2}
GLUCOSE BLD-MCNC: 94 MG/DL (ref 70–99)
HCT VFR BLD CALC: 43.8 % (ref 40.7–50.3)
HDLC SERPL-MCNC: 41 MG/DL
HEMOGLOBIN: 14 G/DL (ref 13–17)
IMMATURE GRANULOCYTES: 0 %
LDL CHOLESTEROL: 164 MG/DL (ref 0–130)
LYMPHOCYTES # BLD: 36 % (ref 24–43)
MCH RBC QN AUTO: 30.1 PG (ref 25.2–33.5)
MCHC RBC AUTO-ENTMCNC: 32 G/DL (ref 28.4–34.8)
MCV RBC AUTO: 94.2 FL (ref 82.6–102.9)
MONOCYTES # BLD: 8 % (ref 3–12)
NRBC AUTOMATED: 0 PER 100 WBC
PDW BLD-RTO: 11.9 % (ref 11.8–14.4)
PLATELET # BLD: 218 K/UL (ref 138–453)
PLATELET ESTIMATE: NORMAL
PMV BLD AUTO: 10.8 FL (ref 8.1–13.5)
POTASSIUM SERPL-SCNC: 4.4 MMOL/L (ref 3.7–5.3)
RBC # BLD: 4.65 M/UL (ref 4.21–5.77)
RBC # BLD: NORMAL 10*6/UL
SEG NEUTROPHILS: 53 % (ref 36–65)
SEGMENTED NEUTROPHILS ABSOLUTE COUNT: 3.03 K/UL (ref 1.5–8.1)
SODIUM BLD-SCNC: 143 MMOL/L (ref 135–144)
TOTAL PROTEIN: 7 G/DL (ref 6.4–8.3)
TRIGL SERPL-MCNC: 97 MG/DL
TSH SERPL DL<=0.05 MIU/L-ACNC: 3.73 MIU/L (ref 0.3–5)
VITAMIN D 25-HYDROXY: 32.9 NG/ML (ref 30–100)
VLDLC SERPL CALC-MCNC: ABNORMAL MG/DL (ref 1–30)
WBC # BLD: 5.7 K/UL (ref 3.5–11.3)
WBC # BLD: NORMAL 10*3/UL

## 2019-09-10 PROCEDURE — 36415 COLL VENOUS BLD VENIPUNCTURE: CPT

## 2019-09-10 PROCEDURE — 85025 COMPLETE CBC W/AUTO DIFF WBC: CPT

## 2019-09-10 PROCEDURE — 82306 VITAMIN D 25 HYDROXY: CPT

## 2019-09-10 PROCEDURE — 80061 LIPID PANEL: CPT

## 2019-09-10 PROCEDURE — 80053 COMPREHEN METABOLIC PANEL: CPT

## 2019-09-10 PROCEDURE — 84443 ASSAY THYROID STIM HORMONE: CPT

## 2019-09-11 DIAGNOSIS — E78.5 HYPERLIPIDEMIA, UNSPECIFIED HYPERLIPIDEMIA TYPE: Primary | ICD-10-CM

## 2019-11-01 RX ORDER — MIRTAZAPINE 15 MG/1
15 TABLET, FILM COATED ORAL NIGHTLY
Qty: 90 TABLET | Refills: 1 | Status: SHIPPED | OUTPATIENT
Start: 2019-11-01 | End: 2020-05-28 | Stop reason: SDUPTHER

## 2019-11-01 RX ORDER — BUPROPION HYDROCHLORIDE 100 MG/1
TABLET, EXTENDED RELEASE ORAL
Qty: 90 TABLET | Refills: 1 | Status: SHIPPED | OUTPATIENT
Start: 2019-11-01 | End: 2020-05-28 | Stop reason: SDUPTHER

## 2020-05-28 ENCOUNTER — OFFICE VISIT (OUTPATIENT)
Dept: FAMILY MEDICINE CLINIC | Age: 45
End: 2020-05-28
Payer: COMMERCIAL

## 2020-05-28 VITALS
TEMPERATURE: 98 F | BODY MASS INDEX: 26.58 KG/M2 | HEART RATE: 77 BPM | DIASTOLIC BLOOD PRESSURE: 84 MMHG | OXYGEN SATURATION: 99 % | WEIGHT: 180 LBS | RESPIRATION RATE: 16 BRPM | SYSTOLIC BLOOD PRESSURE: 121 MMHG

## 2020-05-28 PROCEDURE — 99213 OFFICE O/P EST LOW 20 MIN: CPT | Performed by: FAMILY MEDICINE

## 2020-05-28 RX ORDER — BUPROPION HYDROCHLORIDE 100 MG/1
TABLET, EXTENDED RELEASE ORAL
Qty: 90 TABLET | Refills: 1 | Status: SHIPPED | OUTPATIENT
Start: 2020-05-28 | End: 2020-10-06 | Stop reason: SDUPTHER

## 2020-05-28 RX ORDER — MIRTAZAPINE 15 MG/1
15 TABLET, FILM COATED ORAL NIGHTLY
Qty: 90 TABLET | Refills: 1 | Status: SHIPPED | OUTPATIENT
Start: 2020-05-28 | End: 2020-10-06 | Stop reason: SDUPTHER

## 2020-05-28 SDOH — ECONOMIC STABILITY: TRANSPORTATION INSECURITY
IN THE PAST 12 MONTHS, HAS THE LACK OF TRANSPORTATION KEPT YOU FROM MEDICAL APPOINTMENTS OR FROM GETTING MEDICATIONS?: NO

## 2020-05-28 SDOH — HEALTH STABILITY: PHYSICAL HEALTH: ON AVERAGE, HOW MANY DAYS PER WEEK DO YOU ENGAGE IN MODERATE TO STRENUOUS EXERCISE (LIKE A BRISK WALK)?: 6 DAYS

## 2020-05-28 SDOH — ECONOMIC STABILITY: FOOD INSECURITY: WITHIN THE PAST 12 MONTHS, YOU WORRIED THAT YOUR FOOD WOULD RUN OUT BEFORE YOU GOT MONEY TO BUY MORE.: NEVER TRUE

## 2020-05-28 SDOH — HEALTH STABILITY: PHYSICAL HEALTH: ON AVERAGE, HOW MANY MINUTES DO YOU ENGAGE IN EXERCISE AT THIS LEVEL?: 30 MIN

## 2020-05-28 SDOH — ECONOMIC STABILITY: INCOME INSECURITY: HOW HARD IS IT FOR YOU TO PAY FOR THE VERY BASICS LIKE FOOD, HOUSING, MEDICAL CARE, AND HEATING?: NOT HARD AT ALL

## 2020-05-28 SDOH — ECONOMIC STABILITY: TRANSPORTATION INSECURITY
IN THE PAST 12 MONTHS, HAS LACK OF TRANSPORTATION KEPT YOU FROM MEETINGS, WORK, OR FROM GETTING THINGS NEEDED FOR DAILY LIVING?: NO

## 2020-05-28 SDOH — ECONOMIC STABILITY: FOOD INSECURITY: WITHIN THE PAST 12 MONTHS, THE FOOD YOU BOUGHT JUST DIDN'T LAST AND YOU DIDN'T HAVE MONEY TO GET MORE.: NEVER TRUE

## 2020-05-28 SDOH — HEALTH STABILITY: MENTAL HEALTH
STRESS IS WHEN SOMEONE FEELS TENSE, NERVOUS, ANXIOUS, OR CAN'T SLEEP AT NIGHT BECAUSE THEIR MIND IS TROUBLED. HOW STRESSED ARE YOU?: NOT AT ALL

## 2020-05-28 NOTE — PROGRESS NOTES
 Folliculitis 2/55/6127    Strep pharyngitis 4/8/2015      Past Surgical History:   Procedure Laterality Date    VASECTOMY  2014       Family History   Problem Relation Age of Onset    Mental Illness Mother     Coronary Art Dis Father     Heart Attack Father     No Known Problems Brother     Uterine Cancer Maternal Grandmother     COPD Maternal Grandfather         was a smoker    Diabetes type 2  Maternal Great Grandmother        Social History     Tobacco Use    Smoking status: Former Smoker     Start date: 1/1/2004     Last attempt to quit: 1/1/2005     Years since quitting: 15.4    Smokeless tobacco: Former User   Substance Use Topics    Alcohol use: Yes     Comment: decreased to 1 beer/wk off and on ; NICHOLAS--12 days ago. No DUI / DT      Current Outpatient Medications   Medication Sig Dispense Refill    mirtazapine (REMERON) 15 MG tablet Take 1 tablet by mouth nightly 90 tablet 1    buPROPion (WELLBUTRIN SR) 100 MG extended release tablet Take 1 tab in AM---Start 90 tablet 1     No current facility-administered medications for this visit. No Known Allergies    Health Maintenance   Topic Date Due    Lipid screen  09/10/2024    DTaP/Tdap/Td vaccine (2 - Td) 08/08/2026    Flu vaccine  Completed    HIV screen  Completed    Hepatitis A vaccine  Aged Out    Hepatitis B vaccine  Aged Out    Hib vaccine  Aged Out    Meningococcal (ACWY) vaccine  Aged Out    Pneumococcal 0-64 years Vaccine  Aged Out       Subjective:     Review of Systems   Constitutional: Negative. HENT: Negative. Eyes: Negative. Respiratory: Negative. Negative for shortness of breath. Cardiovascular: Negative. Negative for chest pain. Gastrointestinal: Negative. Genitourinary: Negative. Musculoskeletal: Negative. Negative for myalgias. Skin: Negative. Allergic/Immunologic: Negative for environmental allergies, food allergies and immunocompromised state. Neurological: Negative.   Negative for focal weakness. Psychiatric/Behavioral: Negative. Objective:     Physical Exam  Vitals signs and nursing note reviewed. Constitutional:       General: He is awake. He is not in acute distress. Appearance: Normal appearance. He is not toxic-appearing. HENT:      Head: Normocephalic and atraumatic. Right Ear: Tympanic membrane, ear canal and external ear normal.      Left Ear: Tympanic membrane, ear canal and external ear normal.      Nose: Nose normal.      Mouth/Throat:      Mouth: Mucous membranes are moist.   Eyes:      Extraocular Movements: Extraocular movements intact. Conjunctiva/sclera: Conjunctivae normal.      Pupils: Pupils are equal, round, and reactive to light. Neck:      Musculoskeletal: Normal range of motion and neck supple. Cardiovascular:      Rate and Rhythm: Normal rate and regular rhythm. Pulses: Normal pulses. Heart sounds: Normal heart sounds. No murmur. Pulmonary:      Effort: Pulmonary effort is normal.      Breath sounds: Normal breath sounds. Abdominal:      General: Abdomen is flat. Bowel sounds are normal.      Palpations: Abdomen is soft. Musculoskeletal: Normal range of motion. Neurological:      General: No focal deficit present. Mental Status: He is alert and oriented to person, place, and time. Psychiatric:         Attention and Perception: Attention normal.         Mood and Affect: Mood and affect normal.         Speech: Speech normal.         Behavior: Behavior normal.       /84   Pulse 77   Temp 98 °F (36.7 °C) (Tympanic)   Resp 16   Wt 180 lb (81.6 kg)   SpO2 99%   BMI 26.58 kg/m²     Assessment:       Diagnosis Orders   1. Mixed hyperlipidemia  Lipid, Fasting   2. Generalized anxiety disorder     3. Major depressive disorder, recurrent episode, mild ([address]     4. Diabetes mellitus screening  Comprehensive Metabolic Panel, Fasting             Plan:    Hyperlipid - recheck labs as discussed.   If LDL remains elevated, we will start statin therapy    FROILAN/depression - patient doing well on remeron and wellbutrin. Continue current medications. Continue with counseling as well. Return in about 1 year (around 5/28/2021) for physical.    Orders Placed This Encounter   Procedures    Lipid, Fasting     Standing Status:   Future     Standing Expiration Date:   5/28/2021    Comprehensive Metabolic Panel, Fasting     Standing Status:   Future     Standing Expiration Date:   5/28/2021     Orders Placed This Encounter   Medications    mirtazapine (REMERON) 15 MG tablet     Sig: Take 1 tablet by mouth nightly     Dispense:  90 tablet     Refill:  1    buPROPion (WELLBUTRIN SR) 100 MG extended release tablet     Sig: Take 1 tab in AM---Start     Dispense:  90 tablet     Refill:  1       Patient given educational materials - see patient instructions. Discussed use, benefit, and side effects of prescribed medications. All patientquestions answered. Pt voiced understanding. Reviewed health maintenance. Instructedto continue current medications, diet and exercise. Patient agreed with treatmentplan. Follow up as directed.      Electronically signed by Nancy Morrell MD on 6/1/2020 at 9:32 PM

## 2020-06-01 ASSESSMENT — ENCOUNTER SYMPTOMS
SHORTNESS OF BREATH: 0
GASTROINTESTINAL NEGATIVE: 1
RESPIRATORY NEGATIVE: 1
EYES NEGATIVE: 1

## 2020-08-27 ENCOUNTER — HOSPITAL ENCOUNTER (OUTPATIENT)
Age: 45
Setting detail: SPECIMEN
Discharge: HOME | End: 2020-08-27
Payer: COMMERCIAL

## 2020-08-27 LAB
ALBUMIN SERPL-MCNC: 4.5 G/DL (ref 3.5–5.2)
ALBUMIN/GLOBULIN RATIO: 1.7 (ref 1–2.5)
ALP BLD-CCNC: 67 U/L (ref 40–129)
ALT SERPL-CCNC: 26 U/L (ref 5–41)
ANION GAP SERPL CALCULATED.3IONS-SCNC: 12 MMOL/L (ref 9–17)
AST SERPL-CCNC: 22 U/L
BILIRUB SERPL-MCNC: 0.49 MG/DL (ref 0.3–1.2)
BUN BLDV-MCNC: 17 MG/DL (ref 6–20)
BUN/CREAT BLD: NORMAL (ref 9–20)
CALCIUM SERPL-MCNC: 9.2 MG/DL (ref 8.6–10.4)
CHLORIDE BLD-SCNC: 105 MMOL/L (ref 98–107)
CHOLESTEROL, FASTING: 219 MG/DL
CHOLESTEROL/HDL RATIO: 4.9
CO2: 25 MMOL/L (ref 20–31)
CREAT SERPL-MCNC: 1.1 MG/DL (ref 0.7–1.2)
GFR AFRICAN AMERICAN: >60 ML/MIN
GFR NON-AFRICAN AMERICAN: >60 ML/MIN
GFR SERPL CREATININE-BSD FRML MDRD: NORMAL ML/MIN/{1.73_M2}
GFR SERPL CREATININE-BSD FRML MDRD: NORMAL ML/MIN/{1.73_M2}
GLUCOSE FASTING: 85 MG/DL (ref 70–99)
HDLC SERPL-MCNC: 45 MG/DL
LDL CHOLESTEROL: 143 MG/DL (ref 0–130)
POTASSIUM SERPL-SCNC: 4.7 MMOL/L (ref 3.7–5.3)
SODIUM BLD-SCNC: 142 MMOL/L (ref 135–144)
TOTAL PROTEIN: 7.2 G/DL (ref 6.4–8.3)
TRIGLYCERIDE, FASTING: 155 MG/DL
VLDLC SERPL CALC-MCNC: ABNORMAL MG/DL (ref 1–30)

## 2020-09-01 ENCOUNTER — EMPLOYEE WELLNESS (OUTPATIENT)
Dept: OTHER | Age: 45
End: 2020-09-01

## 2020-09-01 LAB
CHOLESTEROL/HDL RATIO: 6
CHOLESTEROL: 222 MG/DL
GLUCOSE BLD-MCNC: 89 MG/DL (ref 70–99)
HDLC SERPL-MCNC: 37 MG/DL
LDL CHOLESTEROL: 158 MG/DL (ref 0–130)
PATIENT FASTING?: YES
TRIGL SERPL-MCNC: 137 MG/DL
VLDLC SERPL CALC-MCNC: ABNORMAL MG/DL (ref 1–30)

## 2020-10-06 RX ORDER — BUPROPION HYDROCHLORIDE 100 MG/1
TABLET, EXTENDED RELEASE ORAL
Qty: 90 TABLET | Refills: 1 | Status: SHIPPED | OUTPATIENT
Start: 2020-10-06 | End: 2021-01-17 | Stop reason: SDUPTHER

## 2020-10-06 RX ORDER — MIRTAZAPINE 15 MG/1
15 TABLET, FILM COATED ORAL NIGHTLY
Qty: 90 TABLET | Refills: 1 | Status: SHIPPED | OUTPATIENT
Start: 2020-10-06 | End: 2021-01-17 | Stop reason: SDUPTHER

## 2020-10-19 VITALS — BODY MASS INDEX: 26.58 KG/M2 | WEIGHT: 180 LBS

## 2021-01-19 NOTE — TELEPHONE ENCOUNTER
Next Visit Date:  No future appointments.     Health Maintenance   Topic Date Due    Hepatitis C screen  1975    Flu vaccine (1) 09/01/2020    Lipid screen  09/01/2025    DTaP/Tdap/Td vaccine (2 - Td) 08/08/2026    HIV screen  Completed    Hepatitis A vaccine  Aged Out    Hepatitis B vaccine  Aged Out    Hib vaccine  Aged Out    Meningococcal (ACWY) vaccine  Aged Out    Pneumococcal 0-64 years Vaccine  Aged Out       No results found for: LABA1C          ( goal A1C is < 7)   No results found for: LABMICR  LDL Cholesterol (mg/dL)   Date Value   09/01/2020 158 (H)   08/27/2020 143 (H)       (goal LDL is <100)   AST (U/L)   Date Value   08/27/2020 22     ALT (U/L)   Date Value   08/27/2020 26     BUN (mg/dL)   Date Value   08/27/2020 17     BP Readings from Last 3 Encounters:   05/28/20 121/84   07/23/19 127/89   09/11/18 123/82          (goal 120/80)    All Future Testing planned in [facility]              Patient Active Problem List:     Hyperlipidemia     Elevated cholesterol     Major depressive disorder, recurrent episode, mild (HCC)     Generalized anxiety disorder

## 2021-01-20 RX ORDER — BUPROPION HYDROCHLORIDE 100 MG/1
TABLET, EXTENDED RELEASE ORAL
Qty: 90 TABLET | Refills: 1 | Status: SHIPPED | OUTPATIENT
Start: 2021-01-20

## 2021-01-20 RX ORDER — MIRTAZAPINE 15 MG/1
15 TABLET, FILM COATED ORAL NIGHTLY
Qty: 90 TABLET | Refills: 1 | Status: SHIPPED | OUTPATIENT
Start: 2021-01-20

## 2022-03-10 ENCOUNTER — OFFICE VISIT (OUTPATIENT)
Dept: ORTHOPEDIC SURGERY | Age: 47
End: 2022-03-10
Payer: COMMERCIAL

## 2022-03-10 VITALS — HEIGHT: 69 IN | WEIGHT: 180 LBS | RESPIRATION RATE: 12 BRPM | BODY MASS INDEX: 26.66 KG/M2

## 2022-03-10 DIAGNOSIS — M25.561 RIGHT KNEE PAIN, UNSPECIFIED CHRONICITY: Primary | ICD-10-CM

## 2022-03-10 PROCEDURE — 99203 OFFICE O/P NEW LOW 30 MIN: CPT | Performed by: PHYSICIAN ASSISTANT

## 2022-03-10 NOTE — PROGRESS NOTES
[address]              Snapshot plan:  Pt was seen in office today for:   Chief Complaint   Patient presents with    Knee Pain     right, runner, stopped in december     Plan: Patient presents with intermittent pain in the right knee but main concern is structural integrity and strength of his knee. Prescription given for physical therapy, return in 6 weeks. If no better consider MRI. Patient will also consider knee injection as he wants to defer injection on this visit. Chief Compliant:  Chief Complaint   Patient presents with    Knee Pain     right, runner, stopped in december        History of Present Illness: This is a 40 y.o. male who presents to the clinic today for evaluation of right knee pain. Patient states that he has been a runner most of his life and has increased pain with full flexion and while running. He states that it does not hurt him all the time but is concerned for his future of his knee as he wants to continue being active and would not like an injury. He has not done any previous treatment on the right knee other than his own form of home exercises for knee strengthening.   He has switched to biking as opposed to running, he also does weightlifting for his musculature in his legs      Past History:    Current Outpatient Medications:     mirtazapine (REMERON) 15 MG tablet, Take 1 tablet by mouth nightly, Disp: 90 tablet, Rfl: 1    buPROPion (WELLBUTRIN SR) 100 MG extended release tablet, Take 1 tab in AM---Start, Disp: 90 tablet, Rfl: 1  No Known Allergies  Social History     Socioeconomic History    Marital status:      Spouse name: Not on file    Number of children: Not on file    Years of education: Not on file    Highest education level: Not on file   Occupational History    Occupation:      Employer: [employer]   Tobacco Use    Smoking status: Former Smoker     Start date: 2004     Quit date: 2005     Years since quittin.1    Smokeless tobacco: Former User   Vaping Use    Vaping Use: Never used   Substance and Sexual Activity    Alcohol use: Yes     Comment: decreased to 1 beer/wk off and on ; NICHOLAS--12 days ago. No DUI / DT    Drug use: No    Sexual activity: Yes     Partners: Female     Birth control/protection: Surgical     Comment: Has been with current partner for 11 years   Other Topics Concern    Not on file   Social History Narrative    Not on file     Social Determinants of Health     Financial Resource Strain:     Difficulty of Paying Living Expenses: Not on file   Food Insecurity:     Worried About Running Out of Food in the Last Year: Not on file    Bryant of Food in the Last Year: Not on file   Transportation Needs:     Lack of Transportation (Medical): Not on file    Lack of Transportation (Non-Medical):  Not on file   Physical Activity:     Days of Exercise per Week: Not on file    Minutes of Exercise per Session: Not on file   Stress:     Feeling of Stress : Not on file   Social Connections:     Frequency of Communication with Friends and Family: Not on file    Frequency of Social Gatherings with Friends and Family: Not on file    Attends Temple Services: Not on file    Active Member of [address] [place] [address] [employer] or Organizations: Not on file    Attends Club or Organization Meetings: Not on file    Marital Status: Not on file   Intimate Partner Violence:     Fear of Current or Ex-Partner: Not on file    Emotionally Abused: Not on file    Physically Abused: Not on file    Sexually Abused: Not on file   Housing Stability:     Unable to Pay for Housing in the Last Year: Not on file    Number of [place] in the Last Year: Not on file    Unstable Housing in the Last Year: Not on file     Past Medical History:   Diagnosis Date    Anxiety     BMI 27.0-27.9,adult 2018    Depression     Folliculitis 1956    Strep pharyngitis 4/8/2015     Past Surgical History:   Procedure Laterality Date    VASECTOMY  2014     Family History   Problem Relation Age of Onset    Mental Illness Mother     Coronary Art Dis Father     Heart Attack Father     No Known Problems Brother     Uterine Cancer Maternal Grandmother     COPD Maternal Grandfather         was a smoker    Diabetes type 2  Maternal Great Grandmother         Review of Systems   Constitutional: Negative for fever, chills, sweats. Respiratory/Cardio: Negative for Chest pain, palpitations, SOB, or cough. Gastrointestinal: Negative for abdominal pain, N/V/D. Neurological: Negative for headache, numbness, or weakness. Integumentary: Negative for rash, itching, laceration, or abrasion. Musculoskeletal: Positive for right knee pain    All other systems reviewed and negative    Physical Exam:  Constitutional: Patient is oriented to person, place, and time. Patient appears well-developed and well nourished. HENT: Negative otherwise noted  Head: Normocephalic and Atraumatic  Nose: Normal  Eyes: Conjunctivae and EOM are normal  Neck: Normal range of motion Neck supple. Respiratory/Cardio: Effort normal. No respiratory distress. Skin: warm and dry, no rash or erythema  Vasculature: 2+ pedal pulses bilaterally  Neuro: Sensation grossly intact to light touch diffusely  Neurological: Patient is alert and oriented to person, place, and time. Normal strenght. No sensory deficit.   Skin: Skin is warm and dry  Psychiatric: Behavior is normal. Thought content normal.    Musculoskeletal:   right Knee:   Effusion: none  Flexion: Full range of motion with pain at full flexion mild to moderate  Extension: normal  Crepitation: mild without pain                     Pain with deep flexion: mild-moderate  Patellar apprehension: negative  Medial joing line tenderness: moderate pain  Lateral joint line tenderness: none  Lachman: negative  Anterior drawer: negative  Posterior drawer: negative  MCL: negative  LCL: negative    Examination patient's left knee notes no obvious effusion. No significant pain with motion. Shannan's is negative no patellofemoral pain or apprehension. Collaterals and cruciates appear to be appropriate motion 0 130 degrees patient does not have any attributable or significant swelling of the lower extremity today. no calf tenderness, negative Homans. Nursing note and vitals reviewed        Imagin view right knee x-ray in the office shows mild joint spacing, very minimal arthritic changes mainly medial aspect of bilateral knees on AP view, and lateral view there is a small effusion noted with bone spurring to the patella without signs of fractures, possible bone island posterior aspect of the knee     Orders Placed This Encounter   Procedures    XR KNEE RIGHT (1-2 VIEWS)     Standing Status:   Future     Number of Occurrences:   1     Standing Expiration Date:   3/10/2023       Assessment and Plan:  1. Right knee pain, unspecified chronicity          PLAN:  Patient presents with right knee pain acute on chronic. No injury documented. Patient will benefit from physical therapy to strengthen the right knee. If no better by next visit, consider MRI and possible injection if patient agrees to it. Pt instructed to call office if symptoms worsen. Instructed if unable to contact office, to report to ER. Electronically signed by HENRIQUE Francisco on 3/10/22 at 9:48 AM EST        Please note that this chart was generated using voice recognition Dragon dictation software. Although every effort was made to ensure the accuracy of this automated transcription, some errors in transcription may have occurred.

## 2022-03-15 ENCOUNTER — TELEPHONE (OUTPATIENT)
Dept: ORTHOPEDIC SURGERY | Age: 47
End: 2022-03-15

## 2022-03-15 DIAGNOSIS — M25.561 RIGHT KNEE PAIN, UNSPECIFIED CHRONICITY: Primary | ICD-10-CM

## 2022-03-15 NOTE — TELEPHONE ENCOUNTER
Pt called in and said that at [address] [place] [address], he was told that a script for PT was going to be placed but he has not been contacted yet. He was informed that there was not a referral placed. PT referral for [facility] was placed and pt was given the number to call and get scheduled.

## 2022-03-25 ENCOUNTER — HOSPITAL ENCOUNTER (OUTPATIENT)
Dept: PHYSICAL THERAPY | Facility: CLINIC | Age: 47
Setting detail: THERAPIES SERIES
Discharge: HOME | End: 2022-03-25
Payer: COMMERCIAL

## 2022-03-25 PROCEDURE — 97161 PT EVAL LOW COMPLEX 20 MIN: CPT

## 2022-03-25 NOTE — CONSULTS
[] [facility]) - [facility] &  Therapy  [address]  [phone]  F: [phone] [] [address]  [place] [address]   [address]  P: [phone]  F: [phone] [x] [address] &  Therapy  [address]  P: [phone]  F: [phone] [] [address]  P: [phone]  F: [phone] [] [address]  [facility]   [address]   [place]: [phone]  F: [phone]      Physical Therapy Lower Extremity Evaluation    Date:  3/25/2022  Patient: Taye Stagger  : 1975  MRN: [id number]  Physician: Paulla Essex, PA   Insurance: [employer] ( visits approved)   Medical Diagnosis: M25.561 (ICD-10-CM) - Right knee pain, unspecified chronicity    Rehab Codes: M25.561, M62.81, M25.361  Onset date: 21   Next Dr's appt.: PRN    Subjective:   CC/HPI: Patient reports to physical therapy with (R) knee. Patient reports that back in December he was running multiple 5ks. States he has been running since high school. States that he did not have pain with running, however after running he began to notice an achy feeling within the (R) knee. States that he also notice a sharp pain when maximally bending his knee. States he has pronated feet and has shoes to accommodate. States that he got new shoes in December and attempted to run in them, however cont to have pain. States he has also had (R) lumbar pain for 7-8 years, however pain has exacerbated within the last couple of weeks. Patient states that at this time he has not been running and has been strength training and biking due to pain. Patient states that he saw Paulla Essex on 3/10/22, where x-rays were taken showing no findings. States he has deferred injections at this time.       PMHx: [x] Unremarkable [] Diabetes [] HTN  [] Pacemaker   [] MI/Heart Problems   [] Cancer   [] Arthritis [] Other:              [x] Refer to full medical chart  In EPIC       Comorbidities:   [] Obesity [] Dialysis  [] N/A   [] Asthma/COPD [] Dementia [] Other:    [] Stroke [] Sleep apnea [] Other:   [] Vascular disease [] Rheumatic disease [] Other:     Tests: [x] X-Ray: [] MRI:  [] Other:   3/10/22  Narrative   2 view right knee x-ray in the office shows mild joint spacing, very    minimal arthritic changes mainly medial aspect of bilateral knees on AP    view, and lateral view there is a small effusion noted with bone spurring    to the patella without signs of fractures, possible bone island posterior    aspect of the knee         Medications: [x] Refer to full medical record [] None [] Other:  Allergies:      [x] Refer to full medical record [] None [] Other:    Function:  Hand Dominance  [] Right  [] Left   [address] Status [x]  Normal duty   [] Light duty   [] Off due to condition    []  Retired   [] Not employed   [] Disability  [] Other:  []  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       ADL/IADL Previous level of function Current level of function Who currently assists the patient with task   Bathing  [x] Independent  [] Assist [x] Independent  [] Assist    Dress/grooming [x] Independent  [] Assist [x] Independent  [] Assist    Transfer/mobility [x] Independent  [] Assist [x] Independent  [] Assist     Feeding [x] Independent  [] Assist [x] Independent  [] Assist    Toileting [x] Independent  [] Assist [x] Independent  [] Assist    Driving [x] Independent  [] Assist [x] Independent  [] Assist    Housekeeping [x] Independent  [] Assist [x] Independent  [] Assist    Grocery shop/meal prep [x] Independent  [] Assist [x] Independent  [] Assist      Gait Prior level of function Current level of function    [x] Independent  [] Assist [x] Independent  [] Assist   Device: [x] Independent [x] Independent    [] Straight Cane [] Quad cane [] Straight Cane [] Quad cane    [] Standard walker [] Rolling walker   [] 4 wheeled walker [] Standard walker [] Rolling walker   [] 4 wheeled walker    [] Wheelchair [] Wheelchair     Pain:  [x] Yes  [] No Location: (R) knee Pain Rating: (0-10 scale) 2-3/10 after running, 5-6/10 with sharp pain bending knee  Pain altered Tx:  [] Yes  [] No  Action:    Symptoms:  [] Improving [x] Worsening [] Same  Better:  [] AM    [] PM    [] Sit    [] Rise/Sit    []Stand    [] Walk    [] Lying    [x] Other: Not running, not bending knee    Worse: [] AM    [] PM    [] Sit    [] Rise/Sit    [x]Stand    [x] Walk    [] Lying    [] Bend                      [] Valsalva    [] Other: Running, bending knee  Sleep: [x] OK    [] Disturbed    Objective:    ROM  ° A/P STRENGTH TESTS (+/-) Left Right Not Tested    Left Right Left Right Ant.  Drawer (-) (-) []   Hip Flex [facility] WFL 4 4 Post. Drawer (-) (-) []   Ext [facility] WF 4- 4- Lachmans (-) (-) []   ER [facility] WFL 5 4 Valgus Stress (-) (-) []   IR WFL WFL 5 4 Varus Stress (-) (-)  []   ABD WFL WFL 4 4 Shannans (-) (+) pain []   ADD     Bounce Home (-) (+) pain []   Knee Flex 130 130 5 4+ Apleys Dist.   []   Ext 0 0 5 5 Hip Scouring   []   Ankle DF WFL WFL 5 5 CIROs (+) (-) []   PF WFL WFL 5 5 Piriformis (+) (-) []   INV     90/90 165  deg 155 deg []   EVER     Talor Tilt   []        Pat-Fem Grind   []       OBSERVATION No Deficit Deficit Not Tested Comments   Posture       Forward Head [] [x] []    Rounded Shoulders [] [x] []    Iliac Crest [] [x] [] Elevated (L) side  (L) posterior innominate rotation   PSIS [] [x] [] Elevated (R) side   ASIS [] [x] [] Elevated (L) side   Genu Valgus [] [x] []    Genu Varus [x] [] []    Genu Recurvatum [] [x] []    Pronation [] [x] []    Supination [x] [] []    Palpation [] [x] [] TTP (R) medial tibial plateau and (R) medial femoral condyle   Sensation [x] [] []    Edema [] [] [x]    Neurological [] [] [x]    Patellar Mobility [x] [] []    Patellar Orientation [x] [] []    Gait [x] [] [] Analysis:          FUNCTION Normal Difficult Unable   Sitting [x] [] []   Standing [x] [] []   Ambulation [x] [] []   Groom/Dress [x] [] []   Lift/Carry [x] [] []   Stairs [x] [] []   Bending [x] [] []   Squat [x] [] []   Kneel [x] [] []     BALANCE/PROPRIOCEPTION              [x] Not tested   Single leg stance       R                     L                                PAIN   Eyes open                         Sec. Sec                  . [x]    Eyes closed                          Sec. Sec                  . []        FUNCTIONAL TESTS PAIN NO PAIN COMMENTS   30 sec STS [] []    Squat [] [x] Quad dominance, no medial knee collapse, reports of pressure into front of knee     Functional Test: LEFS Score: 9% functionally impaired     Comments:    Assessment:  Patient is a 40year old male who presents to physical therapy with (R) knee pain. Patient demonstrates impairments in pain tolerance, (B) LE strength, squat mechanics, hamstring length and piriformis length. These impairments affect the patient's ability to complete normal ex routine and run without increased pain. Patient would benefit from skilled physical therapy in order to improve impairments, improve overall quality of life and assist patient back to running. Educated patient on evaluation findings and poc. Educated patient on how hip, knee and ankle work as a unit and an impairment in one joint can lead to impairments and pain in other joints within the LE. Discussed good squat mechanics in order to decrease pressure within anterior knee. Discussed cont with current ex program and incorporating warm up and cool down routine in order to improve mobility within (B) LE and low back. Patient verbalized good understanding of all education at this time. Problems:    [x] ? Pain:  [x] ? ROM:  [x] ? Strength:  [x] ?  Function:  [x] Other: Balance       STG: (to be met in 6 treatments)  1. ? Pain: Patient will report pain as 0/10 after running  2. ? ROM: Patient will improve 90/90 HS length by 5 deg bilaterally in order to improve running mechanics   3. ? Function: Patient will demonstrate good squatting mechanics with improved activation of glute max and hip mobility in order to decrease tension on anterior knee joint when strength training  4. Patient to be independent with home exercise program as demonstrated by performance with correct form without cues. LTG: (to be met in 12 treatments)  1. Patient will improve LEFS to 4% functionally impaired in order to indicate improved overall quality of life  2. Patient will improve (B) LE strength to 5/5 in order to reduce tension on medial knee joint when strength training or running   3. Patient will report being able to run a 5k without pain in order to allow patient to run Girls on [employer] Run race with daughter in May  4. Patient will report pain at the worst as 2/10                   Patient goals: \"Assist in the process of figuring out what is wrong with my knee\"    Rehab Potential:  [x] Good  [] Fair  [] Poor   Suggested Professional Referral:  [x] No  [] Yes:  Barriers to Goal Achievement:  [x] No  [] Yes:  Domestic Concerns:  [x] No  [] Yes:    Pt. Education:  [x] Plans/Goals, Risks/Benefits discussed  [x] Home exercise program    Access Code: [email or web address]  URL: [email or web address]  Date: 03/25/2022  Prepared by: Anastasia Angelucci    Exercises  Half Kneeling Hip Flexor Stretch - 2 x daily - 7 x weekly - 3 sets - 30 sec hold  Supine Piriformis Stretch with Leg Straight - 2 x daily - 7 x weekly - 3 sets - 30 sec hold  Seated Table Hamstring Stretch - 2 x daily - 7 x weekly - 3 sets - 30 sec hold  Bridge with Hip Abduction and Resistance - 2 x daily - 7 x weekly - 3 sets - 10 reps  Clamshell - 2 x daily - 7 x weekly - 3 sets - 10 reps  Sidelying Hip Abduction - 2 x daily - 7 x weekly - 3 sets - 10 reps      Method of Education: [x] Verbal  [x] Demo  [x] Written  Comprehension of Education:  [x] Verbalizes understanding. [x] Demonstrates understanding. [x] Needs Review. [] Demonstrates/verbalizes understanding of HEP/Ed previously given. Treatment Plan:  [x] Therapeutic Exercise   69719  [] Iontophoresis: 4 mg/mL Dexamethasone Sodium Phosphate  mAmin  55685   [x] Therapeutic Activity  30178 [x] Vasopneumatic cold with compression  00901    [x] Gait Training   76462 [] Ultrasound   78577   [] Neuromuscular Re-education  31769 [] Electrical Stimulation Unattended  13982   [x] Manual Therapy  76717 [] Electrical Stimulation Attended  67377   [x] Instruction in HEP  [] Lumbar/Cervical Traction  09537   [] Aquatic Therapy   97507 [x] Cold/hotpack    [] Massage   42706      [] Dry Needling, 1 or 2 muscles  66685   [] Biofeedback, first 15 minutes   79016  [] Biofeedback, additional 15 minutes   65770 [] Dry Needling, 3 or more muscles  91559     [x]  Medication allergies reviewed for use of    Dexamethasone Sodium Phosphate 4mg/ml     with iontophoresis treatments. Pt is not allergic.     Frequency:  2 x/week for 12 visits        Todays Treatment:  Modalities:   Precautions:  Exercises:  Exercise Reps/ Time Weight/ Level Comments   Bike            SB S      HS S      Piriformis S      Kneeling hip flexor S            Bridges with hip abduction      Clamshells      SL Hip Abd            Other: Shot gun technique- 2'    Specific Instructions for next treatment: Progress hip mobility, glute med and glute max strength       Evaluation Complexity:  History (Personal factors, comorbidities) [x] 0 [] 1-2 [] 3+   Exam (limitations, restrictions) [x] 1-2 [] 3 [] 4+   Clinical presentation (progression) [x] Stable [] Evolving  [] Unstable   Decision Making [x] Low [] Moderate [] High    [x] Low Complexity [] Moderate Complexity [] High Complexity       Treatment Charges: Mins Units   [x] Evaluation       [x] Low       []  Moderate       []  High 50 1   []  Modalities     [x]  Ther Exercise     []  Manual Therapy     []  Ther Activities     []  Aquatics     []  Vasocompression     []  Other       TOTAL TREATMENT TIME: 50 min    Time in: 10:00 am   Time Out: 11:00 am    Electronically signed by: Sherrie Krueger PT        Physician Signature:________________________________Date:__________________  By signing above or cosigning this note, I have reviewed this plan of care and certify a need for medically necessary rehabilitation services.      *PLEASE SIGN ABOVE AND FAX BACK ALL PAGES*

## 2022-03-28 ENCOUNTER — HOSPITAL ENCOUNTER (OUTPATIENT)
Dept: PHYSICAL THERAPY | Facility: CLINIC | Age: 47
Setting detail: THERAPIES SERIES
Discharge: HOME | End: 2022-03-28
Payer: COMMERCIAL

## 2022-03-28 PROCEDURE — 97110 THERAPEUTIC EXERCISES: CPT

## 2022-03-28 NOTE — FLOWSHEET NOTE
[] [facility]) [facility] &  Therapy  [address]  [phone]  F: [phone] [] [address]  [facility] [address]   [address]  P: [phone]  F: [phone] [x] [address] &  Therapy  [address]  P: [phone]  F: [phone] [] [address]  P: [phone]  F: [phone] [] [address]  [facility]   [address]   [facility] [place]: [phone]  F: [phone]      Physical Therapy Daily Treatment Note    Date:  3/28/2022  Patient Name:  Isabella Levin     :  1975  MRN: [id number]  Physician: HENRIQUE Leon                               Insurance: [facility] [employer] ( visits approved)   Medical Diagnosis: M25.561 (ICD-10-CM) - Right knee pain, unspecified chronicity                      Rehab Codes: M25.561, M62.81, M25.361  Onset date: 21                           Next 's appt.: PRN  Visit# / total visits:      Cancels/No Shows: 0/0    Subjective:    Pain:  [x] Yes  [] No Location: R knee  Pain Rating: (0-10 scale) /10  Pain altered Tx:  [x] No  [] Yes  Action:  Comments:    Objective:         Todays Treatment:  Modalities:   Precautions:  Exercise: R knee Reps/ Time Weight/ Level Comments   Bike  5'                 SB S  3x30\"       HS S  3x30\"       Piriformis S         Kneeling hip flexor S                   Bridges with hip abduction  2x10  lime     Clamshells  3x10  lime      SL Hip Abd  3x10       Prone hip ext  2x10 ea   1 pillow         TGym squats  20x L17                    Other: Shot gun technique-   Hypervolt - R HS, piriformis - 5'     Specific Instructions for next treatment: Progress hip mobility, glute med and glute max strength        Treatment Charges: Mins Units   []  Modalities     [x]  Ther Exercise 41 3   [x] Manual Therapy 5 nc   []  Ther Activities     []  Aquatics     []  Vasocompression     []  Other     Total Treatment time 46 3       Assessment: [x] Progressing toward goals. Began treatment on Bike for warm up prior to stretching and strengthening. Reviewed and completed stretches provided for HEP to ensure proper technique. Added light resistance to clamshells this date to progress strength. Pt with notable increase in fatigue with light resistance. Needed cueing to ensure proper technique with SL therex. Utilized hypervolt this date to address soft tissue restrictions. Completed TGym squats with no pain but pt mentions pain is only present with end range flexion but not present today. [] No change. [] Other:  [x] Patient would continue to benefit from skilled physical therapy services in order to: decrease pain symptoms of R knee and allow pt to return to PLOF. STG: (to be met in 6 treatments)  1. ? Pain: Patient will report pain as 0/10 after running  2. ? ROM: Patient will improve 90/90 HS length by 5 deg bilaterally in order to improve running mechanics   3. ? Function: Patient will demonstrate good squatting mechanics with improved activation of glute max and hip mobility in order to decrease tension on anterior knee joint when strength training  4. Patient to be independent with home exercise program as demonstrated by performance with correct form without cues. LTG: (to be met in 12 treatments)  1. Patient will improve LEFS to 4% functionally impaired in order to indicate improved overall quality of life  2. Patient will improve (B) LE strength to 5/5 in order to reduce tension on medial knee joint when strength training or running   3. Patient will report being able to run a 5k without pain in order to allow patient to run Girls on the Run race with daughter in May  4. Patient will report pain at the worst as 2/10                    Patient goals:  \"Assist in the process of figuring out what is wrong with my knee\"    Pt. Education:  [x] Yes  [] No  [x] Reviewed Prior HEP/Ed  Method of Education: [x] Verbal  [] Demo  [] Written  Comprehension of Education:  [x] Verbalizes understanding. [x] Demonstrates understanding. [x] Needs review. [] Demonstrates/verbalizes HEP/Ed previously given. Date: 03/25/2022  Prepared by: Tommy Guevara     Exercises  Half Kneeling Hip Flexor Stretch - 2 x daily - 7 x weekly - 3 sets - 30 sec hold  Supine Piriformis Stretch with Leg Straight - 2 x daily - 7 x weekly - 3 sets - 30 sec hold  Seated Table Hamstring Stretch - 2 x daily - 7 x weekly - 3 sets - 30 sec hold  Bridge with Hip Abduction and Resistance - 2 x daily - 7 x weekly - 3 sets - 10 reps  Clamshell - 2 x daily - 7 x weekly - 3 sets - 10 reps  Sidelying Hip Abduction - 2 x daily - 7 x weekly - 3 sets - 10 reps        Plan: [x] Continue current frequency toward long and short term goals.     [x] Specific Instructions for subsequent treatments: cont per POC      Time In: 9:04 am            Time Out: 9:53 am    Electronically signed by:  Julita Mayberry PTA

## 2022-04-01 ENCOUNTER — HOSPITAL ENCOUNTER (OUTPATIENT)
Dept: PHYSICAL THERAPY | Facility: CLINIC | Age: 47
Setting detail: THERAPIES SERIES
Discharge: HOME | End: 2022-04-01
Payer: COMMERCIAL

## 2022-04-01 PROCEDURE — 97750 PHYSICAL PERFORMANCE TEST: CPT

## 2022-04-01 PROCEDURE — 97112 NEUROMUSCULAR REEDUCATION: CPT

## 2022-04-01 PROCEDURE — 97110 THERAPEUTIC EXERCISES: CPT

## 2022-04-01 NOTE — FLOWSHEET NOTE
[] [facility]) - [facility] &  Therapy  [address]  [phone]  F: [phone] [] [address]  [facility] [address]   [address]  P: [phone]  F: [phone] [x] [address] &  Therapy  [address]  P: [phone]  F: [phone] [x] [address]  P: [phone]  F: [phone] [] [address]  [facility]   [address]   [place]: [phone]  F: [phone]      Physical Therapy Daily Treatment Note    Date:  2022  Patient Name:  Kiana Joiner     :  1975  MRN: [id number]  Physician: Hernan Martins, PA                               Insurance: [employer] ( visits approved)   Medical Diagnosis: M25.561 (ICD-10-CM) - Right knee pain, unspecified chronicity                      Rehab Codes: M25.561, M62.81, M25.361  Onset date: 21                           Next Dr's appt.: PRN  Visit# / total visits: 3/12    Cancels/No Shows: 0/0    Subjective:    Pain:  [x] Yes  [] No Location: R knee  Pain Rating: (0-10 scale) 0/10; 6-7/10 worst with [place] [facility] style sitting. After running, 2-3/10. Pain altered Tx:  [x] No  [] Yes  Action:  Comments: LBP was significantly better after the initial eval, especially the shotgun maneuver. Walking fast still aggravates the R knee. Uses the Guides. No locking or buckling. No functional limitation as long as he does not flex the knee too much or run. Typical mileage ranges 4 miles/day 25-30 miles/wk. Pace: 9 min/mile 150-170 bpm.  RHR is 60 bpm.    But can go up to 10 mile long run. He starts to notice it about 1/2 mile into the run. In the past, he would have to stop due to pain.       Objective:  Modalities:   Precautions:  Exercise: R knee Reps/ Time Weight/ Level Comments   Monster walks 4x20' black     Lateral step downs 2x15 6\"     Split squats *      Heel taps *                                                                                  Other:   Video Run Analysis   Pace:  10 min/mile     Shoes: guide   Ania: 176 spm    Frontal plane deviations:    Dynamic genu varus duncan           Sagittal plane deviations:     near full knee extension at initial contact    Elevated foot ground angle at initial contact    Knees extend over toes at midstance    Increased lordosis    Decreased knee flexion during swing phase       Other:      Recommendations:     Use a mirror for visual feedback and verbal cue not to let the knees collapse outward  into varus    Need to work on increasing knee flexion at initial contact and swing phase           Specific Instructions for next treatment:  1. Progress hip mobility, glute med and glute max strength  2. Work on running mechanics        Treatment Charges: Mins Units   [x]  Phys perf test 15 1   [x]  Ther Exercise 35 2   []  Manual Therapy     []  Ther Activities     []  Aquatics     []  Vasocompression     [x]  Other NMR 20 1   Total Treatment time 70 4       Assessment: [x] Progressing toward goals. In terms of running mechanics, he presents with primary faults of overstriding and genu varus during loading phase. Ania was acceptable. His corrective exercises were focused on glut medius, TrA, and glut max as he is very much quad dominant. Hip flexors and hamstrings are stiff due to prolonged sitting at work. Advanced his HEP. [] No change. [] Other:  [x] Patient would continue to benefit from skilled physical therapy services in order to: decrease pain symptoms of R knee and allow pt to return to PLOF.      STG: (to be met in 6 treatments)  1. ? Pain: Patient will report pain as 0/10 after running  2. ? ROM: Patient will improve 90/90 HS length by 5 deg bilaterally in order to improve running mechanics   3. ? Function: Patient will demonstrate good squatting mechanics with improved activation of glute max and hip mobility in order to decrease tension on anterior knee joint when strength training  4. Patient to be independent with home exercise program as demonstrated by performance with correct form without cues. LTG: (to be met in 12 treatments)  1. Patient will improve LEFS to 4% functionally impaired in order to indicate improved overall quality of life  2. Patient will improve (B) LE strength to 5/5 in order to reduce tension on medial knee joint when strength training or running   3. Patient will report being able to run a 5k without pain in order to allow patient to run Girls on [employer] Run race with daughter in May  4. Patient will report pain at the worst as 2/10                    Patient goals: \"Assist in the process of figuring out what is wrong with my knee\"    Pt. Education:  [x] Yes  [] No  [x] Reviewed Prior HEP/Ed  Method of Education:   [x] Verbal  Reviewed postural awareness of no crosslegged standing or lateral pelvic tilt. [x] Demo  Reviewed how to use lax ball for hip flexor release    [x] Written  Access Code: [email or web address]  URL: [email or web address]  Date: 04/01/2022  Prepared by: Reji Meza    Exercises  Half Kneeling Hip Flexor Stretch - 2 x daily - 7 x weekly - 3 sets - 30 sec hold  Supine Piriformis Stretch with Leg Straight - 2 x daily - 7 x weekly - 3 sets - 30 sec hold  Seated Table Hamstring Stretch - 2 x daily - 7 x weekly - 3 sets - 30 sec hold  Bridge with Hip Abduction and Resistance - 2 x daily - 7 x weekly - 3 sets - 10 reps  Sidelying Hip Abduction at Wall - 2 x daily - 7 x weekly - 3 sets - 10 reps  Clamshell - 2 x daily - 7 x weekly - 3 sets - 10 reps  Side Stepping with Resistance at Feet - 2 x daily - 7 x weekly - 4 sets - 10-15 reps  Lateral Step Down - 2 x daily - 7 x weekly - 2 sets - 10-20 reps      Comprehension of Education:  [x] Verbalizes understanding. [x] Demonstrates understanding.   [x] Needs review. [] Demonstrates/verbalizes HEP/Ed previously given. Plan: [x] Continue current frequency toward long and short term goals.     [x] Specific Instructions for subsequent treatments: cont per POC      Time In: 1100            Time Out:1220    Electronically signed by:  Eduardo Diaz PT

## 2022-04-08 ENCOUNTER — HOSPITAL ENCOUNTER (OUTPATIENT)
Dept: PHYSICAL THERAPY | Facility: CLINIC | Age: 47
Setting detail: THERAPIES SERIES
Discharge: HOME | End: 2022-04-08
Payer: COMMERCIAL

## 2022-04-08 PROCEDURE — 97110 THERAPEUTIC EXERCISES: CPT

## 2022-04-08 NOTE — FLOWSHEET NOTE
[] [facility]) - [facility] &  Therapy  [address]  [phone]  F: [phone] [] [address]  [address]   [address]  P: [phone]  F: [phone] [x] [address]  Therapy  [address] [facility]  P: [phone]  F: [phone] [x] [address]  P: [phone]  F: [phone] [] [address]  [facility]   [address]   [place]: [phone]  F: [phone]      Physical Therapy Daily Treatment Note    Date:  2022  Patient Name:  Ravin Montague     :  1975  MRN: [id number]  Physician: HENRIQUE Weinberg                               Insurance: [employer] ( visits approved)   Medical Diagnosis: M25.561 (ICD-10-CM) - Right knee pain, unspecified chronicity                      Rehab Codes: M25.561, M62.81, M25.361  Onset date: 21                           Next Dr's appt.: PRN  Visit# / total visits:     Cancels/No Shows: 0/0    Subjective:    Pain:  [x] Yes  [] No Location: R knee  Pain Rating: (0-10 scale) 0/10; 4-5/10 worst with [place] [facility] style sitting. After running, 2-3/10. Pain altered Tx:  [x] No  [] Yes  Action:  Comments: \"LBP is all but gone. \"   Has had decreased pain in the knee. Gluts have been sore for the ex.     Objective:  Modalities: none  Precautions: standard   reps  notes          Supine       1 legged bridges 2x10      Prone        Flying squirrels 20   2 pillows under hips   sidelying      Clamshells 1xfatigue  MRE Issued blue TB   Hip Abd 1xfatigue        Gym         Lateral step downs 15 6\"     Monster walks 2x20' black    Heel taps 15 6\"    Split squats 15     running 2' 10'/mile Mirror for varus correction                             Other:   Manual  1. DI to distal hip flexor, proximal iliacus and psoas were normal    NMR-not on 4/8   Pace:  10 min/mile   Shoes: guide   Ania: 176 spm    Frontal plane deviations:    Dynamic genu varus duncan        Sagittal plane deviations:     near full knee extension at initial contact    Elevated foot ground angle at initial contact    Knees extend over toes at midstance    Increased lordosis    Decreased knee flexion during swing phase     Recommendations:     Use a mirror for visual feedback and verbal cue not to let the knees collapse outward  into varus    Need to work on increasing knee flexion at initial contact and swing phase        Specific Instructions for next treatment:  1. Progress hip mobility, glute med and glute max strength  2. Work on running mechanics    Treatment Charges: Mins Units   []  Phys perf test     [x]  Ther Exercise 50 3   []  Manual Therapy     []  Ther Activities     []  Aquatics     []  Vasocompression     []  Other NMR     Total Treatment time 50 3       Assessment: [x] Progressing toward goals. Today's program was limited by LE fatigue and not pain. R knee \"feels stronger\" reviewed and advanced his HEP. [] No change. [] Other:  [x] Patient would continue to benefit from skilled physical therapy services in order to: decrease pain symptoms of R knee and allow pt to return to PLOF. STG: (to be met in 6 treatments)  1. ? Pain: Patient will report pain as 0/10 after running  2. ? ROM: Patient will improve 90/90 HS length by 5 deg bilaterally in order to improve running mechanics   3. ? Function: Patient will demonstrate good squatting mechanics with improved activation of glute max and hip mobility in order to decrease tension on anterior knee joint when strength training  4. Patient to be independent with home exercise program as demonstrated by performance with correct form without cues. LTG: (to be met in 12 treatments)  1.  Patient will improve LEFS to 4% functionally impaired in order to indicate improved overall quality of life  2. Patient will improve (B) LE strength to 5/5 in order to reduce tension on medial knee joint when strength training or running   3. Patient will report being able to run a 5k without pain in order to allow patient to run [employer] race with daughter in May  4. Patient will report pain at the worst as 2/10                    Patient goals: \"Assist in the process of figuring out what is wrong with my knee\"    Pt. Education:  [x] Yes  [] No  [x] Reviewed Prior HEP/Ed  Method of Education:   [x] Verbal  Cleared to walk/run 3'/2' x 6cycles at an easy effort for run and easy recovery pace for walk. [x] Ezra  Reviewed how to perform piriformis stretch with assistance of his wife    [x] Written  Access Code: [email or web address]  URL: [email or web address]  Date: 04/08/2022  Prepared by: Viktor Wilkes    Exercises  Single Leg Bridge - 2 x daily - 7 x weekly - 2 sets - 10 reps  Supine Piriformis Stretch with Leg Straight - 2 x daily - 5 x weekly - 3 sets - 30 sec hold  Sidelying Hip Abduction at Wall - 2 x daily - 5 x weekly - 3 sets - 10 reps  Clamshell - 2 x daily - 5 x weekly - 3 sets - 10 reps  Prone heel squeeze + lift - 2 x daily - 5 x weekly - 2 sets - 15 reps  Half Kneeling Hip Flexor Stretch - 2 x daily - 5 x weekly - 3 sets - 30 sec hold  Side Stepping with Resistance at Feet - 2 x daily - 5 x weekly - 4 sets - 10-15 reps  Lateral Step Down - 2 x daily - 5 x weekly - 2 sets - 10-20 reps  Forward Step Down - 2 x daily - 5 x weekly - 1 sets - 20 reps  Split Squat Lunge - 2 x daily - 5 x weekly - 2 sets - 15-20 reps    Comprehension of Education:  [x] Verbalizes understanding. [x] Demonstrates understanding. [x] Needs review. [] Demonstrates/verbalizes HEP/Ed previously given. Plan: [x] Continue current frequency toward long and short term goals.   See in 1 wk and then return in 2 wks  [x] Specific Instructions for subsequent treatments: cont per POC      Time In: 6614 Time VUX:6804  Electronically signed by:  Alfonso Donald PT

## 2022-04-14 ENCOUNTER — HOSPITAL ENCOUNTER (OUTPATIENT)
Dept: PHYSICAL THERAPY | Facility: CLINIC | Age: 47
Setting detail: THERAPIES SERIES
Discharge: HOME | End: 2022-04-14
Payer: COMMERCIAL

## 2022-04-14 PROCEDURE — 97110 THERAPEUTIC EXERCISES: CPT

## 2022-04-14 NOTE — FLOWSHEET NOTE
[] [facility]  [address]  [phone]  F: [phone] [] [address]  [address]   [address]  P: [phone]  F: [phone] [x] [address]  Therapy  [address] [facility] [address]  P: [phone]  F: [phone] [x] [address]  P: [phone]  F: [phone] [] [address]  [facility]   [address]   [place]: [phone]  F: [phone]      Physical Therapy Daily Treatment/Progress Note    Date:  2022  Patient Name:  Hollie Argueta     :  1975  MRN: [id number]  Physician: HENRIQUE Agrawal                               Insurance: [employer] ( visits approved)   Medical Diagnosis: M25.561 (ICD-10-CM) - Right knee pain, unspecified chronicity                      Rehab Codes: M25.561, M62.81, M25.361  Onset date: 21                           Next Dr's appt. :   Visit# / total visits:     Cancels/No Shows: 0/0    Subjective:    Pain:  [x] Yes  [] No Location: R knee  Pain Rating: (0-10 scale) 0/10; 4-5/10 worst with [facility] [place] [facility] style sitting. After running, 6/10. Pain altered Tx:  [x] No  [] Yes  Action:  Comments: ran 2'/walk 3' x 6 cycles last week and felt great during, but the next day he had significant pain 6/10 and took several days to subside. That was the worst his pain has been with running. He strained his adductor while doing split squats 3 days ago.         Objective:  Modalities: none  Precautions: standard   reps  notes          Supine       1 legged bridges 2x10 x     Prone        Hip ext 2x10 x    Flying squirrels 20   2 pillows under hips   sidelying      Clamshells 1xfatigue  MRE Issued blue TB   Hip Abd 2x10        Gym         Lateral step downs 15 6\"     Monster walks 2x20' black    Heel taps 15 6\"    Split squats 15     running 2' 10'/mile Mirror for varus correction                             Other:   Manual  1. DI to distal hip flexor, proximal iliacus and psoas were normal    NMR-not on 4/8   Pace:  10 min/mile   Shoes: guide   Ania: 176 spm    Frontal plane deviations:    Dynamic genu varus duncan        Sagittal plane deviations:     near full knee extension at initial contact    Elevated foot ground angle at initial contact    Knees extend over toes at midstance    Increased lordosis    Decreased knee flexion during swing phase     Recommendations:     Use a mirror for visual feedback and verbal cue not to let the knees collapse outward  into varus    Need to work on increasing knee flexion at initial contact and swing phase        Specific Instructions for next treatment:  1. Progress hip mobility, glute med and glute max strength  2. Work on running mechanics    Treatment Charges: Mins Units   []  Phys perf test     [x]  Ther Exercise 50 3   []  Manual Therapy     []  Ther Activities     []  Aquatics     []  Vasocompression     []  Other NMR     Total Treatment time 50 3       Assessment: [x] Progressing toward goals. His R knee remains very irritable with running as his pain increased to 6/10 and was irritable for several days following 12 min of running. Today's treatment also was not advanced due to L groin strain. He was instructed to follow up with Yousef if it is still an issue at next week's visit. At this time, I would recommend him following up with Yousef for further diagnostic testing and not continue with formal PT. The results would dictate what direction he takes after that with his knee. [] No change. [] Other:  [x] Patient would continue to benefit from skilled physical therapy services in order to: decrease pain symptoms of R knee and allow pt to return to PLOF.      STG: (to be met in 6 treatments)  1. ? Pain: Patient will report pain as 0/10 after running  2. ? ROM: Patient will improve 90/90 HS length by 5 deg bilaterally in order to improve running mechanics   3. ? Function: Patient will demonstrate good squatting mechanics with improved activation of glute max and hip mobility in order to decrease tension on anterior knee joint when strength training  4. Patient to be independent with home exercise program as demonstrated by performance with correct form without cues. LTG: (to be met in 12 treatments)  1. Patient will improve LEFS to 4% functionally impaired in order to indicate improved overall quality of life  2. Patient will improve (B) LE strength to 5/5 in order to reduce tension on medial knee joint when strength training or running   3. Patient will report being able to run a 5k without pain in order to allow patient to run [employer] race with daughter in May  4. Patient will report pain at the worst as 2/10                    Patient goals: \"Assist in the process of figuring out what is wrong with my knee\"    Pt. Education:  [x] Yes  [] No  [x] Reviewed Prior HEP/Ed  Method of Education:   [x] Verbal  Cleared to walk/run 3'/2' x 6cycles at an easy effort for run and easy recovery pace for walk. [x] Demo  Reviewed how to perform piriformis stretch with assistance of his wife    [x] Written  Access Code: [email or web address]  URL: [email or web address]  Date: 04/08/2022  Prepared by: Molly Porter    Exercises  Single Leg Bridge - 2 x daily - 7 x weekly - 2 sets - 10 reps  Supine Piriformis Stretch with Leg Straight - 2 x daily - 5 x weekly - 3 sets - 30 sec hold  Sidelying Hip Abduction at Wall - 2 x daily - 5 x weekly - 3 sets - 10 reps  Clamshell - 2 x daily - 5 x weekly - 3 sets - 10 reps  Prone heel squeeze + lift - 2 x daily - 5 x weekly - 2 sets - 15 reps  Half Kneeling Hip Flexor Stretch - 2 x daily - 5 x weekly - 3 sets - 30 sec hold  Side Stepping with Resistance at Feet - 2 x daily - 5 x weekly - 4 sets - 10-15 reps  Lateral Step Down - 2 x daily - 5 x weekly - 2 sets - 10-20 reps  Forward Step Down - 2 x daily - 5 x weekly - 1 sets - 20 reps  Split Squat Lunge - 2 x daily - 5 x weekly - 2 sets - 15-20 reps    Comprehension of Education:  [x] Verbalizes understanding. [x] Demonstrates understanding. [x] Needs review. [] Demonstrates/verbalizes HEP/Ed previously given. Plan: [] Continue current frequency toward long and short term goals. If he does not have results of his MRI by next visit, cancel that appt.     [] Specific Instructions for subsequent treatments: cont per POC      Time In: 9530 Time Out:7881  Electronically signed by:  Kan Shannon PT

## 2022-04-21 ENCOUNTER — OFFICE VISIT (OUTPATIENT)
Dept: ORTHOPEDIC SURGERY | Age: 47
End: 2022-04-21
Payer: COMMERCIAL

## 2022-04-21 VITALS — BODY MASS INDEX: 26.66 KG/M2 | RESPIRATION RATE: 12 BRPM | HEIGHT: 69 IN | WEIGHT: 180 LBS

## 2022-04-21 DIAGNOSIS — M25.561 RIGHT KNEE PAIN, UNSPECIFIED CHRONICITY: Primary | ICD-10-CM

## 2022-04-21 PROCEDURE — 99213 OFFICE O/P EST LOW 20 MIN: CPT | Performed by: PHYSICIAN ASSISTANT

## 2022-04-21 NOTE — PROGRESS NOTES
[place], [place]                 [address] [facility]           Shonda Godwin          Dept Phone: [phone]           Dept Fax:  [phone]      Chief Compliant:  Chief Complaint   Patient presents with    Knee Pain     Right           Plan from todays visit:   F/u for right knee pain after PT and conservative treatment. We will have patient obtain MRI and follow-up with Dr. Cary Heart      History of Present Illness:   40 y.o. male presents to the office for follow up of right knee. Last visit I saw patient and prescribing physical therapy. He has not completed physical therapy and states that he still having discomfort and instability of the knee. He denies any new injury      Review of Systems   Constitutional: Negative for fever, sweats, weight loss, recent injury, or recent illness  Neurological: Negative for headaches, numbness,  Integumentary: Negative for rashes or swelling  Musculoskeletal: Right knee pain      Physical Exam:  Constitutional: Patient is oriented to person, place, and time. Patient appears well-developed and well nourished. HENT: Negative otherwise noted  Neck: Normal range of motion Neck supple. Respiratory/Cardio: Effort normal. No respiratory distress. Musculoskeletal: Steady gait, rashes  Neurological: Patient is alert and oriented to person, place, and time. Normal strenght. No sensory deficit. Skin: Skin is warm and dry    Nursing note and vitals reviewed. Labs and Imaging:   reviewed       Orders Placed This Encounter   Procedures    MRI KNEE RIGHT WO CONTRAST     Standing Status:   Future     Standing Expiration Date:   4/21/2023       Assessment and Plan:  1. Right knee pain, unspecified chronicity            40 y.o. male   F/u for right knee pain after PT and conservative treatment.   We will have patient obtain MRI and follow-up with  Susan    Provider Attestation:  Hannah Christy, personally performed the services described in this documentation. All medical record entries made by the scribe were at my direction and in my presence. I have reviewed the chart and discharge instructions and agree that the records reflect my personal performance and is accurate and complete. 4/21/22       Please note that this chart was generated using voice recognition Dragon dictation software. Although every effort was made to ensure the accuracy of this automated transcription, some errors in transcription may have occurred.

## 2022-04-29 ENCOUNTER — APPOINTMENT (OUTPATIENT)
Dept: PHYSICAL THERAPY | Facility: CLINIC | Age: 47
End: 2022-04-29
Payer: COMMERCIAL

## 2022-05-03 ENCOUNTER — HOSPITAL ENCOUNTER (OUTPATIENT)
Dept: MRI IMAGING | Age: 47
Discharge: HOME | End: 2022-05-05
Payer: COMMERCIAL

## 2022-05-03 DIAGNOSIS — M25.561 RIGHT KNEE PAIN, UNSPECIFIED CHRONICITY: ICD-10-CM

## 2022-05-03 PROCEDURE — 73721 MRI JNT OF LWR EXTRE W/O DYE: CPT

## 2022-08-16 LAB
CHOLESTEROL/HDL RATIO: 5.1
CHOLESTEROL: 174 MG/DL
GLUCOSE BLD-MCNC: 85 MG/DL (ref 70–99)
HDLC SERPL-MCNC: 34 MG/DL
LDL CHOLESTEROL: 106 MG/DL (ref 0–130)
PATIENT FASTING?: YES
TRIGL SERPL-MCNC: 172 MG/DL

## 2022-10-06 ENCOUNTER — OFFICE VISIT (OUTPATIENT)
Dept: ORTHOPEDIC SURGERY | Age: 47
End: 2022-10-06
Payer: COMMERCIAL

## 2022-10-06 DIAGNOSIS — M25.561 RIGHT KNEE PAIN, UNSPECIFIED CHRONICITY: Primary | ICD-10-CM

## 2022-10-06 PROCEDURE — 99213 OFFICE O/P EST LOW 20 MIN: CPT | Performed by: ORTHOPAEDIC SURGERY

## 2022-10-06 NOTE — PROGRESS NOTES
Christine Ocampo M.D.            [address] [facility] [address] [facility],[address], [address] [facility]           Dept Phone: [phone]           Dept Fax:  [phone]           Shonda Godwin          Dept Phone: [phone]           Dept Fax:  [phone]      Chief Compliant:  Chief Complaint   Patient presents with    Pain     RT KNEE        History of Present Illness: This is a 56 y.o. male who presents to the clinic today for evaluation / follow up of right knee pain. Patient is a 50-year-old who is an avid runner also teaches anatomy at [employer] he had an injury this past December with a twisting type injury. Patient was seen earlier this past April for his knee injury. He did obtain an MRI in May of this year and was told he had a undersurface tear of the posterior horn medial meniscus but he thought he be able to heal this on his own he still complaining of stabbing catching giving sensation of his knee and is here to have definitive treatment. .       Review of Systems   Constitutional: Negative for fever, chills, sweats. Eyes: Negative for changes in vision, or pain. HENT: Negative for ear ache, epistaxis, or sore throat. Respiratory/Cardio: Negative for Chest pain, palpitations, SOB, or cough. Gastrointestinal: Negative for abdominal pain, N/V/D. Genitourinary: Negative for dysuria, frequency, urgency, or hematuria. Neurological: Negative for headache, numbness, or weakness. Integumentary: Negative for rash, itching, laceration, or abrasion. Musculoskeletal: Positive for Pain (RT KNEE)       Physical Exam:  Constitutional: Patient is oriented to person, place, and time. Patient appears well-developed and well nourished.    HENT: Negative otherwise noted  Head: Normocephalic and Atraumatic  Nose: Normal  Eyes: Conjunctivae and EOM are normal  Neck: Normal range of motion Neck supple. Respiratory/Cardio: Effort normal. No respiratory distress. Musculoskeletal: Examination of patient's right knee notes he get his knee all way straight although somewhat uncomfortable with this. He can flex about 130 degrees with pain after that. He has a positive reproducible Shannan's medially collaterals are appropriate endpoint her Lockman's good no patellofemoral pain or apprehension no effusion present    Neurological: Patient is alert and oriented to person, place, and time. Normal strength. No sensory deficit. Skin: Skin is warm and dry  Psychiatric: Behavior is normal. Thought content normal.  Nursing note and vitals reviewed. Labs and Imaging:     XR taken today: No new x-rays taken today    MRI performed on 5/3/2022         Impression   1. Oblique undersurface tear in the posterior horn-body junction of the   medial meniscus with slight outward subluxation of the medial meniscus body. 2. Low-grade partial-thickness interstitial tearing of the superior patellar   tendon. 3. 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 Moderate medial femoral trochlear chondromalacia. Mild-to-moderate medial   compartment chondromalacia. 4. Small Baker's cyst.   5. Mild-to-moderate tendinosis and thinning of the proximal popliteus tendon. No orders of the defined types were placed in this encounter. Assessment and Plan:  Medial meniscus tear right knee        This is a 56 y.o. male who presents to the clinic today for evaluation / follow up of medial meniscus tear right knee.      Past History:    Current Outpatient Medications:     mirtazapine (REMERON) 15 MG tablet, Take 1 tablet by mouth nightly, Disp: 90 tablet, Rfl: 1    buPROPion (WELLBUTRIN SR) 100 MG extended release tablet, Take 1 tab in AM---Start, Disp: 90 tablet, Rfl: 1  No Known Allergies  Social History     Socioeconomic History    Marital status:      Spouse name: Not on file    Number of children: Not on file    Years of education: Not on file    Highest education level: Not on file   Occupational History    Occupation: Nancy Chavez     Employer: [employer]   Tobacco Use    Smoking status: Former     Types: Cigarettes     Start date: 2004     Quit date: 2005     Years since quittin.7    Smokeless tobacco: Former   Vaping Use    Vaping Use: Never used   Substance and Sexual Activity    Alcohol use: Yes     Comment: decreased to 1 beer/wk off and on ; NICHOLAS--12 days ago. No DUI / DT    Drug use: No    Sexual activity: Yes     Partners: Female     Birth control/protection: Surgical     Comment: Has been with current partner for 11 years   Other Topics Concern    Not on file   Social History Narrative    Not on file     Social Determinants of Health     Financial Resource Strain: Not on file   Food Insecurity: Not on file   Transportation Needs: Not on file   Physical Activity: Not on file   Stress: Not on file   Social Connections: Not on file   Intimate Partner Violence: Not on file   Housing Stability: Not on file     Past Medical History:   Diagnosis Date    Anxiety     BMI 27.0-27.9,adult 2018    Depression     Folliculitis     Strep pharyngitis 2015     Past Surgical History:   Procedure Laterality Date    VASECTOMY  2014     Family History   Problem Relation Age of Onset    Mental Illness Mother     Coronary Art Dis Father     Heart Attack Father     No Known Problems Brother     Uterine Cancer Maternal Grandmother     COPD Maternal Grandfather         was a smoker    Diabetes type 2  Maternal Great Grandmother    Plan  I discussed the patient's diagnosis and MRI findings as well as physical findings with the patient.   I did discuss that I did recommend that he would benefit from having arthroscopy examination of his right knee and he does wish to proceed with this we discussed the details procedure as well as risk and benefits. We will get him scheduled accordingly. Provider Attestation:  Roman Almeida, personally performed the services described in this documentation. All medical record entries made by the scribe were at my direction and in my presence. I have reviewed the chart and discharge instructions and agree that the records reflect my personal performance and is accurate and complete. Demetria Ott MD. 10/06/22      Please note that this chart was generated using voice recognition Dragon dictation software. Although every effort was made to ensure the accuracy of this automated transcription, some errors in transcription may have occurred.

## 2022-11-08 ENCOUNTER — HOSPITAL ENCOUNTER (OUTPATIENT)
Dept: PREADMISSION TESTING | Age: 47
Discharge: HOME | End: 2022-11-12
Attending: ORTHOPAEDIC SURGERY | Admitting: ORTHOPAEDIC SURGERY
Payer: COMMERCIAL

## 2022-11-08 VITALS
WEIGHT: 185 LBS | TEMPERATURE: 98 F | DIASTOLIC BLOOD PRESSURE: 89 MMHG | SYSTOLIC BLOOD PRESSURE: 135 MMHG | HEIGHT: 68 IN | RESPIRATION RATE: 16 BRPM | BODY MASS INDEX: 28.04 KG/M2 | OXYGEN SATURATION: 100 % | HEART RATE: 76 BPM

## 2022-11-08 DIAGNOSIS — Z01.818 PREOP EXAMINATION: ICD-10-CM

## 2022-11-08 LAB
ABSOLUTE EOS #: 0.1 K/UL (ref 0–0.4)
ABSOLUTE LYMPH #: 2.1 K/UL (ref 1–4.8)
ABSOLUTE MONO #: 0.5 K/UL (ref 0.1–1.3)
ANION GAP SERPL CALCULATED.3IONS-SCNC: 10 MMOL/L (ref 9–17)
BASOPHILS # BLD: 1 % (ref 0–2)
BASOPHILS ABSOLUTE: 0 K/UL (ref 0–0.2)
BUN BLDV-MCNC: 20 MG/DL (ref 6–20)
CALCIUM SERPL-MCNC: 9.5 MG/DL (ref 8.6–10.4)
CHLORIDE BLD-SCNC: 105 MMOL/L (ref 98–107)
CO2: 27 MMOL/L (ref 20–31)
CREAT SERPL-MCNC: 0.93 MG/DL (ref 0.7–1.2)
EOSINOPHILS RELATIVE PERCENT: 2 % (ref 0–4)
GFR SERPL CREATININE-BSD FRML MDRD: >60 ML/MIN/1.73M2
GLUCOSE BLD-MCNC: 84 MG/DL (ref 70–99)
HCT VFR BLD CALC: 44.8 % (ref 41–53)
HEMOGLOBIN: 15.2 G/DL (ref 13.5–17.5)
LYMPHOCYTES # BLD: 32 % (ref 24–44)
MCH RBC QN AUTO: 30.6 PG (ref 26–34)
MCHC RBC AUTO-ENTMCNC: 34.1 G/DL (ref 31–37)
MCV RBC AUTO: 89.7 FL (ref 80–100)
MONOCYTES # BLD: 8 % (ref 1–7)
PDW BLD-RTO: 12.8 % (ref 11.5–14.9)
PLATELET # BLD: 243 K/UL (ref 150–450)
PMV BLD AUTO: 7.9 FL (ref 6–12)
POTASSIUM SERPL-SCNC: 4.4 MMOL/L (ref 3.7–5.3)
RBC # BLD: 4.99 M/UL (ref 4.5–5.9)
SEG NEUTROPHILS: 57 % (ref 36–66)
SEGMENTED NEUTROPHILS ABSOLUTE COUNT: 3.7 K/UL (ref 1.3–9.1)
SODIUM BLD-SCNC: 142 MMOL/L (ref 135–144)
WBC # BLD: 6.5 K/UL (ref 3.5–11)

## 2022-11-08 PROCEDURE — 85025 COMPLETE CBC W/AUTO DIFF WBC: CPT

## 2022-11-08 PROCEDURE — 93005 ELECTROCARDIOGRAM TRACING: CPT | Performed by: ANESTHESIOLOGY

## 2022-11-08 PROCEDURE — 36415 COLL VENOUS BLD VENIPUNCTURE: CPT

## 2022-11-08 PROCEDURE — 80048 BASIC METABOLIC PNL TOTAL CA: CPT

## 2022-11-08 RX ORDER — CHLORAL HYDRATE 500 MG
CAPSULE ORAL DAILY
COMMUNITY

## 2022-11-08 RX ORDER — BUPROPION HYDROCHLORIDE 300 MG/1
300 TABLET ORAL EVERY MORNING
COMMUNITY

## 2022-11-08 ASSESSMENT — ENCOUNTER SYMPTOMS
WHEEZING: 0
NAUSEA: 0
ABDOMINAL PAIN: 0
COUGH: 0
TROUBLE SWALLOWING: 0
DIARRHEA: 0
SORE THROAT: 0
CONSTIPATION: 0
APNEA: 0
ANAL BLEEDING: 0
BLOOD IN STOOL: 0
RHINORRHEA: 0
VOMITING: 0
SHORTNESS OF BREATH: 0

## 2022-11-08 NOTE — H&P (VIEW-ONLY)
HISTORY and [facility] [id number]       NAME:  Antelmo Snider  MRN: [id number]   YOB: 1975   Date: 11/8/2022   Age: 56 y.o. Gender: male     COMPLAINT AND PRESENT HISTORY:   Antelmo Snider is 56 y.o.,  male, presents for pre-anesthesia/admission testing for KNEE ARTHROSCOPY WITH PARTIAL MEDIAL MENISCECTOMY- RIGHT per Dr. Tootie Goodman. Primary dx: RIGHT KNEE MEDIAL MENISCUS TEAR. HPI:  SEE PORTION OF NOTE BELOW PER DR. Tootie Goodman, 10-6-22 (REVIEWED):  History of Present Illness: This is a 56 y.o. male who presents to the clinic today for evaluation / follow up of right knee pain. Patient is a 72-year-old who is an avid runner also teaches anatomy at [employer] he had an injury this past December with a twisting type injury. Patient was seen earlier this past April for his knee injury. He did obtain an MRI in May of this year and was told he had a undersurface tear of the posterior horn medial meniscus but he thought he be able to heal this on his own he still complaining of stabbing catching giving sensation of his knee and is here to have definitive treatment. .     Plan  I discussed the patient's diagnosis and MRI findings as well as physical findings with the patient. I did discuss that I did recommend that he would benefit from having arthroscopy examination of his right knee and he does wish to proceed with this we discussed the details procedure as well as risk and benefits. We will get him scheduled accordingly.      RECENT IMAGING R/T HPI     Narrative   EXAMINATION:   MRI OF THE RIGHT KNEE WITHOUT CONTRAST, 5/3/2022 6:06 pm       TECHNIQUE:   Multiplanar multisequence MRI of the right knee was performed without the   administration of intravenous contrast.       COMPARISON:   Right knee plain radiographs from 03/10/2022       HISTORY:   ORDERING SYSTEM PROVIDED HISTORY: Right knee pain, unspecified chronicity   TECHNOLOGIST PROVIDED HISTORY:   Reason for Exam: Medial right knee pain       52-year-old male with medial right knee pain       FINDINGS:   MENISCI: Lateral meniscus demonstrates normal morphology and signal   characteristics. No lateral meniscus tear. Oblique undersurface tear in the posterior horn-body junction of the medial   meniscus. Slight outward subluxation of the medial meniscus body. CRUCIATE LIGAMENTS: Anterior and posterior cruciate ligaments appear intact. EXTENSOR MECHANISM: Low-grade partial-thickness interstitial tearing of the   superior patellar tendon on image 19, series 8. Distal quadriceps tendon and   patellar retinacula appear intact. LATERAL COLLATERAL LIGAMENT COMPLEX: Mild-to-moderate tendinosis and thinning   of the proximal popliteus tendon. Lateral collateral ligament, biceps   femoris, iliotibial band grossly unremarkable. MEDIAL COLLATERAL LIGAMENT COMPLEX: Medial collateral ligament complex   appears intact. KNEE JOINT: No sizable joint effusion. Osseous alignment is normal.       No acute fracture or dislocation. Grade 2-3 medial compartment chondromalacia. Grade 3-4 chondromalacia at the patellar apex and medial patellar facet with   underlying subcortical cystic and subchondral reactive marrow changes. Grade 3 medial femoral trochlear chondromalacia. Lateral compartment articular cartilage appears intact without focal chondral   defect. BONE MARROW: Bone marrow signal intensity within the visualized osseous   structures otherwise within normal limits. SOFT TISSUES: Small Baker's cyst.  Visualized popliteal neurovascular bundle   grossly unremarkable. Mild edema in the subcutaneous fat along the anterior   knee. Impression   1. Oblique undersurface tear in the posterior horn-body junction of the   medial meniscus with slight outward subluxation of the medial meniscus body.    2. Low-grade partial-thickness interstitial tearing of the superior patellar tendon. 3. 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 Moderate medial femoral trochlear chondromalacia. Mild-to-moderate medial   compartment chondromalacia. 4. Small Baker's cyst.   5. Mild-to-moderate tendinosis and thinning of the proximal popliteus tendon. X-RAY, RIGHT KNEE, 3-10-22:  Narrative   2 view right knee x-ray in the office shows mild joint spacing, very    minimal arthritic changes mainly medial aspect of bilateral knees on AP    view, and lateral view there is a small effusion noted with bone spurring    to the patella without signs of fractures, possible bone island posterior    aspect of the knee     Review of additional significant medical hx:  HLD, ABNORMAL EKG: Preliminary EKG done during PAT appointment today reveals \"NSR / NONSPECIFIC T WAVE ABNORMALITY / ABNORMAL ECG / 74 BPM\". Father does have significant family hx for CAD, his paternal grand father had an MI (unknown age), maternal aunt had issue w/arrhythmia of some sort and wore a Holter monitor a couple of times. Patient states he does not think he has ever had a prior EKG done. Denies current chest pain, palpitations, SOB, dizziness, leg swelling, headache. Patient does not currently have a PCP, I highly encouraged him to establish care. Current medications r/t condition: OMEGA 3    BP Readings from Last 3 Encounters:   11/08/22 135/89   05/28/20 121/84   07/23/19 127/89      Activity level: Patient states he was fairly active- used to run. Functional Capacity per patient:              1. Patient IS able to walk 2 city blocks on level ground without SOB. 2. Patient IS able to climb 2 flights of stairs without SOB. Denies hx of MRSA infection. Denies hx of blood clots. Denies hx of any personal or family hx of complications w/anesthesia.    PAST MEDICAL HISTORY     Past Medical History:   Diagnosis Date    Abnormal EKG 11/08/2022    Anxiety     BMI 27.0-27.9,adult 01/12/2018 Depression     Folliculitis     Hyperlipidemia 2015    Medial meniscus tear     Right    Strep pharyngitis 2015       SURGICAL HISTORY       Past Surgical History:   Procedure Laterality Date    VASECTOMY  2014    WISDOM TOOTH EXTRACTION         SOCIAL HISTORY       Social History     Socioeconomic History    Marital status:      Spouse name: None    Number of children: None    Years of education: None    Highest education level: None   Occupational History    Occupation: [employer] Professor     Employer: [employer]   Tobacco Use    Smoking status: Former     Types: Cigarettes     Start date: 2004     Quit date: 2005     Years since quittin.8    Smokeless tobacco: Former    Tobacco comments:     Smoked a pack a week for about a year. Vaping Use    Vaping Use: Never used   Substance and Sexual Activity    Alcohol use: Yes     Comment: 1-2 drinks/week    Drug use: No    Sexual activity: Yes     Partners: Female     Birth control/protection: Surgical     Comment: Has been with current partner for 11 years       REVIEW OF SYSTEMS    No Known Allergies    Current Outpatient Medications on File Prior to Encounter   Medication Sig Dispense Refill    buPROPion (WELLBUTRIN XL) 300 MG extended release tablet Take 300 mg by mouth every morning      Omega-3 Fatty Acids (FISH OIL) 1000 MG capsule Take by mouth daily      ASHWAGANDHA PO Take by mouth daily      Multiple Vitamins-Minerals (MENS MULTIVITAMIN PO) Take by mouth daily      mirtazapine (REMERON) 15 MG tablet Take 1 tablet by mouth nightly 90 tablet 1     No current facility-administered medications on file prior to encounter. Review of Systems   Constitutional:  Negative for chills and fever. HENT:  Negative for congestion, ear pain, rhinorrhea, sore throat and trouble swallowing. Respiratory:  Negative for apnea (Denies sleep apnea, states occasionally snores), cough, shortness of breath and wheezing. Cardiovascular:  Negative for chest pain, palpitations and leg swelling. Gastrointestinal:  Negative for abdominal pain, anal bleeding, blood in stool, constipation, diarrhea, nausea and vomiting. Genitourinary:  Negative for dysuria and frequency. Musculoskeletal:         See HPI. Skin:  Positive for wound (Blister to right middle finger). Negative for rash. Neurological:  Negative for dizziness and headaches. Hematological:  Does not bruise/bleed easily. GENERAL PHYSICAL EXAM     Vitals: /89 Comment: 130/93  Pulse 76   Temp 98 °F (36.7 °C)   Resp 16   Ht 5' 8\" (1.727 m)   Wt 185 lb (83.9 kg)   SpO2 100%   BMI 28.13 kg/m²               BP Readings from Last 3 Encounters:   11/08/22 135/89   05/28/20 121/84   07/23/19 127/89     Physical Exam  Vitals reviewed. Constitutional:       General: He is not in acute distress. Appearance: He is well-developed. He is not ill-appearing, toxic-appearing or diaphoretic. HENT:      Head: Normocephalic. Right Ear: External ear normal.      Left Ear: External ear normal.      Nose: Nose normal.      Mouth/Throat:      Pharynx: No oropharyngeal exudate or posterior oropharyngeal erythema. Tonsils: No tonsillar abscesses. Eyes:      General:         Right eye: No discharge. Left eye: No discharge. Conjunctiva/sclera: Conjunctivae normal.      Pupils: Pupils are equal, round, and reactive to light. Cardiovascular:      Rate and Rhythm: Normal rate and regular rhythm. Pulses: Intact distal pulses. Heart sounds: Normal heart sounds. Pulmonary:      Effort: Pulmonary effort is normal. No accessory muscle usage or respiratory distress. Breath sounds: Normal breath sounds. No decreased breath sounds, wheezing, rhonchi or rales. Abdominal:      General: Bowel sounds are normal. There is no distension. Palpations: Abdomen is soft. There is no mass. Tenderness: There is no abdominal tenderness. There is no guarding or rebound. Musculoskeletal:      Right knee: Swelling (Mild, no erythema/warmth) present. Decreased range of motion. Tenderness present over the medial joint line. Normal pulse. Right lower leg: No swelling or tenderness. No edema. Left lower leg: No swelling or tenderness. No edema. Comments: Negative Yosef's sign b/l (performed in sitting position). Lymphadenopathy:      Cervical: No cervical adenopathy. Skin:     General: Skin is warm and dry. Findings: Signs of injury (Tiny open blister to palmar aspect of middle finger to right hand, no drainage) present. Neurological:      Mental Status: He is alert and oriented to person, place, and time.    Psychiatric:         Behavior: Behavior normal.       LAB REVIEW     Lab Results   Component Value Date     11/08/2022    K 4.4 11/08/2022     11/08/2022    CO2 27 11/08/2022    BUN 20 11/08/2022    CREATININE 0.93 11/08/2022    GLUCOSE 84 11/08/2022    CALCIUM 9.5 11/08/2022    PROT 7.2 08/27/2020    LABALBU 4.5 08/27/2020    BILITOT 0.49 08/27/2020    ALKPHOS 67 08/27/2020    AST 22 08/27/2020    ALT 26 08/27/2020    LABGLOM >60 11/08/2022    GFRAA >60 08/27/2020     Lab Results   Component Value Date    WBC 6.5 11/08/2022    HGB 15.2 11/08/2022    HCT 44.8 11/08/2022    MCV 89.7 11/08/2022     11/08/2022     PRELIMINARY EKG REVIEW, DATE: 11-8-22     \"NSR / NONSPECIFIC T WAVE ABNORMALITY / ABNORMAL ECG / 74 BPM\"    SURGERY / PROVISIONAL DIAGNOSES:      KNEE ARTHROSCOPY WITH PARTIAL MEDIAL MENISCECTOMY- RIGHT    RIGHT KNEE MEDIAL MENISCUS TEAR    Patient Active Problem List    Diagnosis Date Noted    Preop examination 11/08/2022    Major depressive disorder, recurrent episode, mild ([facility] [address] 05/16/2018    Generalized anxiety disorder 05/16/2018    Elevated cholesterol 08/08/2016    Hyperlipidemia 02/20/2015           CLEARANCE: Dr. Dejah Rick, anesthesia, was contacted and informed of patient's history and planned surgery. Orders received and no clearance required, however Dr. Brianna Noriega did state that patient should establish care w/a PCP and f/u with PCP regarding abnormal EKG. I did call patient post-PAT appointment to discuss this with him, advised that if he goes to a [id number] [facility] PCP, his EKG would be available in his chart, otherwise can contact medical records for a copy. Patient stated at time of appointment that he is considering establishing care w/S. PEGGY Meehan- office phone number provided for him to schedule.     Total time spent on encounter- PAT provider minutes: 31-40 minutes     MATT Gutierrez CNP on 11/8/2022 at 5:20 PM

## 2022-11-08 NOTE — H&P
HISTORY and [facility] [id number]       NAME:  Kishore Benitez  MRN: [id number]   YOB: 1975   Date: 2022   Age: 56 y.o. Gender: male     COMPLAINT AND PRESENT HISTORY:   Kishore Benitez is 56 y.o.,  male, presents for pre-anesthesia/admission testing for KNEE ARTHROSCOPY WITH PARTIAL MEDIAL MENISCECTOMY- RIGHT per Dr. Reynaldo Ojeda. Primary dx: RIGHT KNEE MEDIAL MENISCUS TEAR. HPI:  SEE PORTION OF NOTE BELOW PER DR. Reynaldo Ojeda, 10-6-22 (REVIEWED):  History of Present Illness: This is a 56 y.o. male who presents to the clinic today for evaluation / follow up of right knee pain. Patient is a 60-year-old who is an avid runner also teaches anatomy at [employer] he had an injury this past December with a twisting type injury. Patient was seen earlier this past April for his knee injury. He did obtain an MRI in May of this year and was told he had a undersurface tear of the posterior horn medial meniscus but he thought he be able to heal this on his own he still complaining of stabbing catching giving sensation of his knee and is here to have definitive treatment. .     Plan  I discussed the patient's diagnosis and MRI findings as well as physical findings with the patient. I did discuss that I did recommend that he would benefit from having arthroscopy examination of his right knee and he does wish to proceed with this we discussed the details procedure as well as risk and benefits. We will get him scheduled accordingly.      RECENT IMAGING R/T HPI     Narrative   EXAMINATION:   MRI OF THE RIGHT KNEE WITHOUT CONTRAST, 5/3/2022 6:06 pm       TECHNIQUE:   Multiplanar multisequence MRI of the right knee was performed without the   administration of intravenous contrast.       COMPARISON:   Right knee plain radiographs from 03/10/2022       HISTORY:   ORDERING SYSTEM PROVIDED HISTORY: Right knee pain, unspecified chronicity   TECHNOLOGIST PROVIDED HISTORY:   Reason for Exam: Medial right knee pain       50-year-old male with medial right knee pain       FINDINGS:   MENISCI: Lateral meniscus demonstrates normal morphology and signal   characteristics. No lateral meniscus tear. Oblique undersurface tear in the posterior horn-body junction of the medial   meniscus. Slight outward subluxation of the medial meniscus body. CRUCIATE LIGAMENTS: Anterior and posterior cruciate ligaments appear intact. EXTENSOR MECHANISM: Low-grade partial-thickness interstitial tearing of the   superior patellar tendon on image 19, series 8. Distal quadriceps tendon and   patellar retinacula appear intact. LATERAL COLLATERAL LIGAMENT COMPLEX: Mild-to-moderate tendinosis and thinning   of the proximal popliteus tendon. Lateral collateral ligament, biceps   femoris, iliotibial band grossly unremarkable. MEDIAL COLLATERAL LIGAMENT COMPLEX: Medial collateral ligament complex   appears intact. KNEE JOINT: No sizable joint effusion. Osseous alignment is normal.       No acute fracture or dislocation. Grade 2-3 medial compartment chondromalacia. Grade 3-4 chondromalacia at the patellar apex and medial patellar facet with   underlying subcortical cystic and subchondral reactive marrow changes. Grade 3 medial femoral trochlear chondromalacia. Lateral compartment articular cartilage appears intact without focal chondral   defect. BONE MARROW: Bone marrow signal intensity within the visualized osseous   structures otherwise within normal limits. SOFT TISSUES: Small Baker's cyst.  Visualized popliteal neurovascular bundle   grossly unremarkable. Mild edema in the subcutaneous fat along the anterior   knee. Impression   1. Oblique undersurface tear in the posterior horn-body junction of the   medial meniscus with slight outward subluxation of the medial meniscus body.    2. Low-grade partial-thickness interstitial tearing of the superior patellar tendon. 3. 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 Moderate medial femoral trochlear chondromalacia. Mild-to-moderate medial   compartment chondromalacia. 4. Small Baker's cyst.   5. Mild-to-moderate tendinosis and thinning of the proximal popliteus tendon. X-RAY, RIGHT KNEE, 3-10-22:  Narrative   2 view right knee x-ray in the office shows mild joint spacing, very    minimal arthritic changes mainly medial aspect of bilateral knees on AP    view, and lateral view there is a small effusion noted with bone spurring    to the patella without signs of fractures, possible bone island posterior    aspect of the knee     Review of additional significant medical hx:  HLD, ABNORMAL EKG: Preliminary EKG done during PAT appointment today reveals \"NSR / NONSPECIFIC T WAVE ABNORMALITY / ABNORMAL ECG / 74 BPM\". Father does have significant family hx for CAD, his paternal grand father had an MI (unknown age), maternal aunt had issue w/arrhythmia of some sort and wore a Holter monitor a couple of times. Patient states he does not think he has ever had a prior EKG done. Denies current chest pain, palpitations, SOB, dizziness, leg swelling, headache. Patient does not currently have a PCP, I highly encouraged him to establish care. Current medications r/t condition: OMEGA 3    BP Readings from Last 3 Encounters:   11/08/22 135/89   05/28/20 121/84   07/23/19 127/89      Activity level: Patient states he was fairly active- used to run. Functional Capacity per patient:              1. Patient IS able to walk 2 city blocks on level ground without SOB. 2. Patient IS able to climb 2 flights of stairs without SOB. Denies hx of MRSA infection. Denies hx of blood clots. Denies hx of any personal or family hx of complications w/anesthesia.    PAST MEDICAL HISTORY     Past Medical History:   Diagnosis Date    Abnormal EKG 11/08/2022    Anxiety     BMI 27.0-27.9,adult 01/12/2018 Depression     Folliculitis     Hyperlipidemia 2015    Medial meniscus tear     Right    Strep pharyngitis 2015       SURGICAL HISTORY       Past Surgical History:   Procedure Laterality Date    VASECTOMY  2014    WISDOM TOOTH EXTRACTION         SOCIAL HISTORY       Social History     Socioeconomic History    Marital status:      Spouse name: None    Number of children: None    Years of education: None    Highest education level: None   Occupational History    Occupation: [employer] Professor     Employer: [employer]   Tobacco Use    Smoking status: Former     Types: Cigarettes     Start date: 2004     Quit date: 2005     Years since quittin.8    Smokeless tobacco: Former    Tobacco comments:     Smoked a pack a week for about a year. Vaping Use    Vaping Use: Never used   Substance and Sexual Activity    Alcohol use: Yes     Comment: 1-2 drinks/week    Drug use: No    Sexual activity: Yes     Partners: Female     Birth control/protection: Surgical     Comment: Has been with current partner for 11 years       REVIEW OF SYSTEMS    No Known Allergies    Current Outpatient Medications on File Prior to Encounter   Medication Sig Dispense Refill    buPROPion (WELLBUTRIN XL) 300 MG extended release tablet Take 300 mg by mouth every morning      Omega-3 Fatty Acids (FISH OIL) 1000 MG capsule Take by mouth daily      ASHWAGANDHA PO Take by mouth daily      Multiple Vitamins-Minerals (MENS MULTIVITAMIN PO) Take by mouth daily      mirtazapine (REMERON) 15 MG tablet Take 1 tablet by mouth nightly 90 tablet 1     No current facility-administered medications on file prior to encounter. Review of Systems   Constitutional:  Negative for chills and fever. HENT:  Negative for congestion, ear pain, rhinorrhea, sore throat and trouble swallowing. Respiratory:  Negative for apnea (Denies sleep apnea, states occasionally snores), cough, shortness of breath and wheezing. Cardiovascular:  Negative for chest pain, palpitations and leg swelling. Gastrointestinal:  Negative for abdominal pain, anal bleeding, blood in stool, constipation, diarrhea, nausea and vomiting. Genitourinary:  Negative for dysuria and frequency. Musculoskeletal:         See HPI. Skin:  Positive for wound (Blister to right middle finger). Negative for rash. Neurological:  Negative for dizziness and headaches. Hematological:  Does not bruise/bleed easily. GENERAL PHYSICAL EXAM     Vitals: /89 Comment: 130/93  Pulse 76   Temp 98 °F (36.7 °C)   Resp 16   Ht 5' 8\" (1.727 m)   Wt 185 lb (83.9 kg)   SpO2 100%   BMI 28.13 kg/m²               BP Readings from Last 3 Encounters:   11/08/22 135/89   05/28/20 121/84   07/23/19 127/89     Physical Exam  Vitals reviewed. Constitutional:       General: He is not in acute distress. Appearance: He is well-developed. He is not ill-appearing, toxic-appearing or diaphoretic. HENT:      Head: Normocephalic. Right Ear: External ear normal.      Left Ear: External ear normal.      Nose: Nose normal.      Mouth/Throat:      Pharynx: No oropharyngeal exudate or posterior oropharyngeal erythema. Tonsils: No tonsillar abscesses. Eyes:      General:         Right eye: No discharge. Left eye: No discharge. Conjunctiva/sclera: Conjunctivae normal.      Pupils: Pupils are equal, round, and reactive to light. Cardiovascular:      Rate and Rhythm: Normal rate and regular rhythm. Pulses: Intact distal pulses. Heart sounds: Normal heart sounds. Pulmonary:      Effort: Pulmonary effort is normal. No accessory muscle usage or respiratory distress. Breath sounds: Normal breath sounds. No decreased breath sounds, wheezing, rhonchi or rales. Abdominal:      General: Bowel sounds are normal. There is no distension. Palpations: Abdomen is soft. There is no mass. Tenderness: There is no abdominal tenderness. There is no guarding or rebound. Musculoskeletal:      Right knee: Swelling (Mild, no erythema/warmth) present. Decreased range of motion. Tenderness present over the medial joint line. Normal pulse. Right lower leg: No swelling or tenderness. No edema. Left lower leg: No swelling or tenderness. No edema. Comments: Negative Yosef's sign b/l (performed in sitting position). Lymphadenopathy:      Cervical: No cervical adenopathy. Skin:     General: Skin is warm and dry. Findings: Signs of injury (Tiny open blister to palmar aspect of middle finger to right hand, no drainage) present. Neurological:      Mental Status: He is alert and oriented to person, place, and time.    Psychiatric:         Behavior: Behavior normal.       LAB REVIEW     Lab Results   Component Value Date     11/08/2022    K 4.4 11/08/2022     11/08/2022    CO2 27 11/08/2022    BUN 20 11/08/2022    CREATININE 0.93 11/08/2022    GLUCOSE 84 11/08/2022    CALCIUM 9.5 11/08/2022    PROT 7.2 08/27/2020    LABALBU 4.5 08/27/2020    BILITOT 0.49 08/27/2020    ALKPHOS 67 08/27/2020    AST 22 08/27/2020    ALT 26 08/27/2020    LABGLOM >60 11/08/2022    GFRAA >60 08/27/2020     Lab Results   Component Value Date    WBC 6.5 11/08/2022    HGB 15.2 11/08/2022    HCT 44.8 11/08/2022    MCV 89.7 11/08/2022     11/08/2022     PRELIMINARY EKG REVIEW, DATE: 11-8-22     \"NSR / NONSPECIFIC T WAVE ABNORMALITY / ABNORMAL ECG / 74 BPM\"    SURGERY / PROVISIONAL DIAGNOSES:      KNEE ARTHROSCOPY WITH PARTIAL MEDIAL MENISCECTOMY- RIGHT    RIGHT KNEE MEDIAL MENISCUS TEAR    Patient Active Problem List    Diagnosis Date Noted    Preop examination 11/08/2022    Major depressive disorder, recurrent episode, mild ([facility] [address] 05/16/2018    Generalized anxiety disorder 05/16/2018    Elevated cholesterol 08/08/2016    Hyperlipidemia 02/20/2015           CLEARANCE: Dr. Jose Hopper, anesthesia, was contacted and informed of patient's history and planned surgery. Orders received and no clearance required, however Dr. Ramon Robles did state that patient should establish care w/a PCP and f/u with PCP regarding abnormal EKG. I did call patient post-PAT appointment to discuss this with him, advised that if he goes to a [facility] PCP, his EKG would be available in his chart, otherwise can contact medical records for a copy. Patient stated at time of appointment that he is considering establishing care w/S. PEGGY Cruz- office phone number provided for him to schedule.     Total time spent on encounter- PAT provider minutes: 31-40 minutes     MATT Elizabeth CNP on 11/8/2022 at 5:20 PM

## 2022-11-08 NOTE — DISCHARGE INSTRUCTIONS
Pre-op Instructions For Out-Patient Surgery    Medication Instructions:  Please stop herbs and any supplements now (includes vitamins and minerals). Please contact your surgeon and prescribing physician for pre-op instructions for any blood thinners. If you have inhalers/aerosol treatments at home, please use them the morning of your surgery and bring the inhalers with you to the hospital.    Please take the following medications the morning of your surgery with a sip of water:    None    Surgery Instructions:  After midnight before surgery:  Do not eat or drink anything, including water, mints, gum, and hard candy. You may brush your teeth without swallowing. No smoking, chewing tobacco, or street drugs. Please shower or bathe before surgery. If you were given Surgical Scrub Chlorhexidine Gluconate Liquid (CHG), please shower the night before and the morning of your surgery following the detailed instructions you received during your pre-admission visit. Please do not wear any cologne, lotion, powder, deodorant, jewelry, piercings, perfume, makeup, nail polish, hair accessories, or hair spray on the day of surgery. Wear loose comfortable clothing. Leave your valuables at home. Bring a storage case for any glasses/contacts. An adult who is responsible for you MUST drive you home and should be with you for the first 24 hours after surgery. If having out-patient knee and foot surgeries, please arrange for planned crutches, walker, or wheelchair before arriving to the hospital.    The Day of Surgery:  Arrive at [address] [place] [address] Surgery Entrance at the time directed by your surgeon and check in at the desk. If you have a living will or healthcare power of , please bring a copy. You will be taken to the pre-op holding area where you will be prepared for surgery. A physical assessment will be performed by a nurse practitioner or house officer. Your IV will be started and you will meet your anesthesiologist.    When you go to surgery, your family will be directed to the surgical waiting room, where the doctor should speak with them after your surgery. After surgery, you will be taken to the recovery room then when you are awake and stable you will go to the short stay unit for preparation to be discharged. If you use a Bi-PAP or C-PAP machine, please bring it with you and leave it in the car in case it is needed in recovery room.

## 2022-11-10 LAB
EKG ATRIAL RATE: 74 BPM
EKG P AXIS: 45 DEGREES
EKG P-R INTERVAL: 168 MS
EKG Q-T INTERVAL: 396 MS
EKG QRS DURATION: 94 MS
EKG QTC CALCULATION (BAZETT): 439 MS
EKG R AXIS: 56 DEGREES
EKG T AXIS: 45 DEGREES
EKG VENTRICULAR RATE: 74 BPM

## 2022-11-10 PROCEDURE — 93010 ELECTROCARDIOGRAM REPORT: CPT | Performed by: INTERNAL MEDICINE

## 2022-11-21 ENCOUNTER — ANESTHESIA EVENT (OUTPATIENT)
Dept: OPERATING ROOM | Age: 47
End: 2022-11-21
Payer: COMMERCIAL

## 2022-11-21 NOTE — PRE-PROCEDURE INSTRUCTIONS
Nothing to eat after midnight.y  Are you taking any blood thinners? nWhen was the last day? Make sure to use Hibiclens prior to surgery. y  Remove any jewelry and body piercings.y  Do you wear glasses? If so, please bring a case to store them in. Are you having any Covid symptoms?n  Do you have any new rashes, infections, etc. that we should be aware of?n  Do you have a ride home the day of surgery?y It cannot be a cab or medical transportation.   Verify surgery time and what time to arrive at hospital. [id number], [id number]

## 2022-11-22 ENCOUNTER — ANESTHESIA (OUTPATIENT)
Dept: OPERATING ROOM | Age: 47
End: 2022-11-22
Payer: COMMERCIAL

## 2022-11-22 ENCOUNTER — HOSPITAL ENCOUNTER (OUTPATIENT)
Age: 47
Setting detail: OUTPATIENT SURGERY
Discharge: HOME | End: 2022-11-22
Attending: ORTHOPAEDIC SURGERY | Admitting: ORTHOPAEDIC SURGERY
Payer: COMMERCIAL

## 2022-11-22 VITALS
SYSTOLIC BLOOD PRESSURE: 127 MMHG | RESPIRATION RATE: 14 BRPM | OXYGEN SATURATION: 100 % | HEIGHT: 68 IN | DIASTOLIC BLOOD PRESSURE: 88 MMHG | BODY MASS INDEX: 28.04 KG/M2 | HEART RATE: 80 BPM | TEMPERATURE: 96.9 F | WEIGHT: 185 LBS

## 2022-11-22 DIAGNOSIS — Z01.818 PREOP EXAMINATION: Primary | ICD-10-CM

## 2022-11-22 PROCEDURE — 7100000031 HC ASPR PHASE II RECOVERY - ADDTL 15 MIN: Performed by: ORTHOPAEDIC SURGERY

## 2022-11-22 PROCEDURE — 29881 ARTHRS KNE SRG MNISECTMY M/L: CPT | Performed by: ORTHOPAEDIC SURGERY

## 2022-11-22 PROCEDURE — 3600000013 HC SURGERY LEVEL 3 ADDTL 15MIN: Performed by: ORTHOPAEDIC SURGERY

## 2022-11-22 PROCEDURE — 7100000011 HC PHASE II RECOVERY - ADDTL 15 MIN: Performed by: ORTHOPAEDIC SURGERY

## 2022-11-22 PROCEDURE — 6360000002 HC RX W HCPCS: Performed by: ORTHOPAEDIC SURGERY

## 2022-11-22 PROCEDURE — 3600000003 HC SURGERY LEVEL 3 BASE: Performed by: ORTHOPAEDIC SURGERY

## 2022-11-22 PROCEDURE — 7100000010 HC PHASE II RECOVERY - FIRST 15 MIN: Performed by: ORTHOPAEDIC SURGERY

## 2022-11-22 PROCEDURE — 6370000000 HC RX 637 (ALT 250 FOR IP): Performed by: ANESTHESIOLOGY

## 2022-11-22 PROCEDURE — 7100000000 HC PACU RECOVERY - FIRST 15 MIN: Performed by: ORTHOPAEDIC SURGERY

## 2022-11-22 PROCEDURE — 6360000002 HC RX W HCPCS: Performed by: NURSE ANESTHETIST, CERTIFIED REGISTERED

## 2022-11-22 PROCEDURE — 3700000001 HC ADD 15 MINUTES (ANESTHESIA): Performed by: ORTHOPAEDIC SURGERY

## 2022-11-22 PROCEDURE — 7100000030 HC ASPR PHASE II RECOVERY - FIRST 15 MIN: Performed by: ORTHOPAEDIC SURGERY

## 2022-11-22 PROCEDURE — 2709999900 HC NON-CHARGEABLE SUPPLY: Performed by: ORTHOPAEDIC SURGERY

## 2022-11-22 PROCEDURE — 7100000001 HC PACU RECOVERY - ADDTL 15 MIN: Performed by: ORTHOPAEDIC SURGERY

## 2022-11-22 PROCEDURE — 2580000003 HC RX 258: Performed by: ANESTHESIOLOGY

## 2022-11-22 PROCEDURE — 3700000000 HC ANESTHESIA ATTENDED CARE: Performed by: ORTHOPAEDIC SURGERY

## 2022-11-22 RX ORDER — SODIUM CHLORIDE, SODIUM LACTATE, POTASSIUM CHLORIDE, CALCIUM CHLORIDE 600; 310; 30; 20 MG/100ML; MG/100ML; MG/100ML; MG/100ML
INJECTION, SOLUTION INTRAVENOUS CONTINUOUS
Status: DISCONTINUED | OUTPATIENT
Start: 2022-11-22 | End: 2022-11-22 | Stop reason: HOSPADM

## 2022-11-22 RX ORDER — FENTANYL CITRATE 50 UG/ML
INJECTION, SOLUTION INTRAMUSCULAR; INTRAVENOUS PRN
Status: DISCONTINUED | OUTPATIENT
Start: 2022-11-22 | End: 2022-11-22 | Stop reason: SDUPTHER

## 2022-11-22 RX ORDER — DEXAMETHASONE SODIUM PHOSPHATE 4 MG/ML
INJECTION, SOLUTION INTRA-ARTICULAR; INTRALESIONAL; INTRAMUSCULAR; INTRAVENOUS; SOFT TISSUE PRN
Status: DISCONTINUED | OUTPATIENT
Start: 2022-11-22 | End: 2022-11-22 | Stop reason: SDUPTHER

## 2022-11-22 RX ORDER — HYDROCODONE BITARTRATE AND ACETAMINOPHEN 5; 325 MG/1; MG/1
1 TABLET ORAL EVERY 6 HOURS PRN
Qty: 20 TABLET | Refills: 0 | Status: SHIPPED | OUTPATIENT
Start: 2022-11-22 | End: 2022-11-27

## 2022-11-22 RX ORDER — METOCLOPRAMIDE HYDROCHLORIDE 5 MG/ML
10 INJECTION INTRAMUSCULAR; INTRAVENOUS
Status: DISCONTINUED | OUTPATIENT
Start: 2022-11-22 | End: 2022-11-22 | Stop reason: HOSPADM

## 2022-11-22 RX ORDER — SODIUM CHLORIDE 9 MG/ML
25 INJECTION, SOLUTION INTRAVENOUS PRN
Status: DISCONTINUED | OUTPATIENT
Start: 2022-11-22 | End: 2022-11-22 | Stop reason: HOSPADM

## 2022-11-22 RX ORDER — SODIUM CHLORIDE 0.9 % (FLUSH) 0.9 %
5-40 SYRINGE (ML) INJECTION PRN
Status: DISCONTINUED | OUTPATIENT
Start: 2022-11-22 | End: 2022-11-22 | Stop reason: HOSPADM

## 2022-11-22 RX ORDER — DIPHENHYDRAMINE HYDROCHLORIDE 50 MG/ML
12.5 INJECTION INTRAMUSCULAR; INTRAVENOUS
Status: DISCONTINUED | OUTPATIENT
Start: 2022-11-22 | End: 2022-11-22 | Stop reason: HOSPADM

## 2022-11-22 RX ORDER — ONDANSETRON 2 MG/ML
INJECTION INTRAMUSCULAR; INTRAVENOUS PRN
Status: DISCONTINUED | OUTPATIENT
Start: 2022-11-22 | End: 2022-11-22 | Stop reason: SDUPTHER

## 2022-11-22 RX ORDER — LORAZEPAM 0.5 MG/1
TABLET ORAL
COMMUNITY
Start: 2022-11-10

## 2022-11-22 RX ORDER — LABETALOL HYDROCHLORIDE 5 MG/ML
10 INJECTION, SOLUTION INTRAVENOUS
Status: DISCONTINUED | OUTPATIENT
Start: 2022-11-22 | End: 2022-11-22 | Stop reason: HOSPADM

## 2022-11-22 RX ORDER — MIDAZOLAM HYDROCHLORIDE 1 MG/ML
INJECTION INTRAMUSCULAR; INTRAVENOUS PRN
Status: DISCONTINUED | OUTPATIENT
Start: 2022-11-22 | End: 2022-11-22 | Stop reason: SDUPTHER

## 2022-11-22 RX ORDER — HYDROCODONE BITARTRATE AND ACETAMINOPHEN 5; 325 MG/1; MG/1
1 TABLET ORAL EVERY 6 HOURS PRN
Status: COMPLETED | OUTPATIENT
Start: 2022-11-22 | End: 2022-11-22

## 2022-11-22 RX ORDER — HYDRALAZINE HYDROCHLORIDE 20 MG/ML
10 INJECTION INTRAMUSCULAR; INTRAVENOUS
Status: DISCONTINUED | OUTPATIENT
Start: 2022-11-22 | End: 2022-11-22 | Stop reason: HOSPADM

## 2022-11-22 RX ORDER — PROPOFOL 10 MG/ML
INJECTION, EMULSION INTRAVENOUS PRN
Status: DISCONTINUED | OUTPATIENT
Start: 2022-11-22 | End: 2022-11-22 | Stop reason: SDUPTHER

## 2022-11-22 RX ORDER — SODIUM CHLORIDE 0.9 % (FLUSH) 0.9 %
5-40 SYRINGE (ML) INJECTION EVERY 12 HOURS SCHEDULED
Status: DISCONTINUED | OUTPATIENT
Start: 2022-11-22 | End: 2022-11-22 | Stop reason: HOSPADM

## 2022-11-22 RX ORDER — FENTANYL CITRATE 0.05 MG/ML
25 INJECTION, SOLUTION INTRAMUSCULAR; INTRAVENOUS EVERY 5 MIN PRN
Status: DISCONTINUED | OUTPATIENT
Start: 2022-11-22 | End: 2022-11-22 | Stop reason: HOSPADM

## 2022-11-22 RX ORDER — ROPIVACAINE HYDROCHLORIDE 5 MG/ML
INJECTION, SOLUTION EPIDURAL; INFILTRATION; PERINEURAL PRN
Status: DISCONTINUED | OUTPATIENT
Start: 2022-11-22 | End: 2022-11-22 | Stop reason: ALTCHOICE

## 2022-11-22 RX ORDER — LIDOCAINE HYDROCHLORIDE 10 MG/ML
1 INJECTION, SOLUTION EPIDURAL; INFILTRATION; INTRACAUDAL; PERINEURAL
Status: DISCONTINUED | OUTPATIENT
Start: 2022-11-22 | End: 2022-11-22 | Stop reason: HOSPADM

## 2022-11-22 RX ORDER — SODIUM CHLORIDE 9 MG/ML
INJECTION, SOLUTION INTRAVENOUS PRN
Status: DISCONTINUED | OUTPATIENT
Start: 2022-11-22 | End: 2022-11-22 | Stop reason: HOSPADM

## 2022-11-22 RX ORDER — ONDANSETRON 2 MG/ML
4 INJECTION INTRAMUSCULAR; INTRAVENOUS
Status: DISCONTINUED | OUTPATIENT
Start: 2022-11-22 | End: 2022-11-22 | Stop reason: HOSPADM

## 2022-11-22 RX ADMIN — HYDROCODONE BITARTRATE AND ACETAMINOPHEN 1 TABLET: 5; 325 TABLET ORAL at 17:46

## 2022-11-22 RX ADMIN — MIDAZOLAM 2 MG: 1 INJECTION INTRAMUSCULAR; INTRAVENOUS at 16:28

## 2022-11-22 RX ADMIN — FENTANYL CITRATE 100 MCG: 50 INJECTION, SOLUTION INTRAMUSCULAR; INTRAVENOUS at 16:28

## 2022-11-22 RX ADMIN — SODIUM CHLORIDE, POTASSIUM CHLORIDE, SODIUM LACTATE AND CALCIUM CHLORIDE: 600; 310; 30; 20 INJECTION, SOLUTION INTRAVENOUS at 14:50

## 2022-11-22 RX ADMIN — DEXAMETHASONE SODIUM PHOSPHATE 4 MG: 4 INJECTION, SOLUTION INTRA-ARTICULAR; INTRALESIONAL; INTRAMUSCULAR; INTRAVENOUS; SOFT TISSUE at 16:31

## 2022-11-22 RX ADMIN — ONDANSETRON 4 MG: 2 INJECTION INTRAMUSCULAR; INTRAVENOUS at 16:31

## 2022-11-22 RX ADMIN — PROPOFOL 100 MG: 10 INJECTION, EMULSION INTRAVENOUS at 16:31

## 2022-11-22 ASSESSMENT — PAIN SCALES - GENERAL
PAINLEVEL_OUTOF10: 6
PAINLEVEL_OUTOF10: 6
PAINLEVEL_OUTOF10: 0
PAINLEVEL_OUTOF10: 6
PAINLEVEL_OUTOF10: 5

## 2022-11-22 ASSESSMENT — PAIN DESCRIPTION - DESCRIPTORS: DESCRIPTORS: THROBBING

## 2022-11-22 ASSESSMENT — PAIN DESCRIPTION - PAIN TYPE
TYPE: SURGICAL PAIN
TYPE: SURGICAL PAIN

## 2022-11-22 ASSESSMENT — PAIN DESCRIPTION - ORIENTATION: ORIENTATION: RIGHT

## 2022-11-22 ASSESSMENT — PAIN - FUNCTIONAL ASSESSMENT: PAIN_FUNCTIONAL_ASSESSMENT: 0-10

## 2022-11-22 ASSESSMENT — PAIN DESCRIPTION - LOCATION: LOCATION: KNEE

## 2022-11-22 ASSESSMENT — PAIN DESCRIPTION - FREQUENCY: FREQUENCY: INTERMITTENT

## 2022-11-22 NOTE — INTERVAL H&P NOTE
Update History & Physical    The patient's History and Physical of November 8, 2022 was reviewed with the patient and I examined the patient. There was no change. Here for KNEE ARTHROSCOPY WITH PARTIAL MEDIAL MENISCECTOMY- RIGHT per Dr. Newton Rehman. Pt AAO x 3 in NAD. HRRR. LS CTA throughout bilaterally. No respiratory distress. NPO p MN. No medications taken this am. Stopped vitamins two weeks ago. Denies taking anticoagulants or blood thinning medications. Denies recent or current chest pain/pressure, palpitations, SOB, recent URI, fever or chills. Review vitals per RN flowsheet.        Electronically signed by MATT Schmidt CNP on 11/22/2022 at 2:00 PM

## 2022-11-22 NOTE — OP NOTE
Operative Note      Patient: Jessica Ribeiro  YOB: 1975  MRN: [id number]    Date of Procedure: 11/22/2022    Pre-Op Diagnosis: RIGHT KNEE MEDIAL MENISCUS TEAR    Post-Op Diagnosis: Same       Procedure(s):  KNEE ARTHROSCOPY WITH PARTIAL MEDIAL MENISCECTOMY right    Surgeon(s):  Juan C Enriquez MD    Assistant:   Resident: Alferd Bence, DO    Anesthesia: General    Estimated Blood Loss (mL): Minimal    Complications: None    Specimens:   * No specimens in log *    Implants:  * No implants in log *      Drains: * No LDAs found *    Findings: Tear of posterior horn and into the body of the medial meniscus as well as some mild chondromalacia grade 2 of the medial femoral condyle    Detailed Description of Procedure:       Patient is a 56y.o. year old male who presents with a history of pain, locking, and giving away sensations of their right knee. Physical examination was positive for Shannan's examination. MRI was insistent with a tear of the posterior horn and into the body of the medial meniscus. Having failed conservative treatment, it was advised that arthroscopic examination and treatment of their knee would be beneficial and consent was obtained with risk and benefits explained to the patient. The patient was taken tot he operative room and general anesthesia was administered. A tourniquet was placed to the operatives lower extremity and then placed in the leg bojorquez. The leg was exsanguinated and the tourniquet inflated to the 300mmHg. The leg was the prepped and draped in the usual sterile fashion. Time-out was called to verify laterality. Medial and lateral portals were made and the scope placed in the lateral portal. The patella femoral joint was examined and noted to have some mild wear grade 2 but nothing quite intervention. . The scope was then passes down the medial gutter into the medial compartment.  A probe was used to assess the medial meniscus and a tear was identified in the posterior horn and into the body of the medial meniscus. A partial medial menisectomy was carried ou with basket forceps and then smoothed out with a shaver. Repeat probing of the meniscus found it to be stable. There was mild grade II chondromalacia of the medial femoral condyle and underlying tibial plateau but nothing required intervention. The arthroscope was then passed into the notch of the knee. The ACL was found not to have any significant damage or laxity. The scope was then passed in the lateral compartment. Thorough probing of the lateral meniscus and the articular cartilage did not demonstrate any significant finding that requires surgical intervention    The scope was then passed into the suprapatellar area and the shaver was used to remove any further soft tissue debris. The scope was removed and the knee evacuated of fluid and injected with 20cc of 0.5% ropivacaine. The sterile bulky dressing was applied and the leg then wrapped with a large 6-inch ace bandage from toes to the mid-thigh. The tourniquet was then deflated at less than 30 minutes. The patient was awaken and taken to the PACU in good condition.       Electronically signed by Ayaka Neumann MD on 11/22/2022 at 4:59 PM

## 2022-11-22 NOTE — ANESTHESIA PRE PROCEDURE
Department of Anesthesiology  Preprocedure Note       Name:  Nivia Avalos   Age:  56 y.o.  :  1975                                          MRN:  [id number]         Date:  2022      Surgeon: Osmar Jacques):  Manuel Nguyen MD    Procedure: Procedure(s):  KNEE ARTHROSCOPY WITH PARTIAL MEDIAL MENISCECTOMY    Medications prior to admission:   Prior to Admission medications    Medication Sig Start Date End Date Taking?  Authorizing Provider   LORazepam (ATIVAN) 0.5 MG tablet  11/10/22   Historical Provider, MD   buPROPion (WELLBUTRIN XL) 300 MG extended release tablet Take 300 mg by mouth every morning    Historical Provider, MD   Omega-3 Fatty Acids (FISH OIL) 1000 MG capsule Take by mouth daily    Historical Provider, MD   ASHWAGANDHA PO Take by mouth daily    Historical Provider, MD   Multiple Vitamins-Minerals (MENS MULTIVITAMIN PO) Take by mouth daily    Historical Provider, MD   mirtazapine (REMERON) 15 MG tablet Take 1 tablet by mouth nightly 21   Sofy Naylor MD       Current medications:    Current Facility-Administered Medications   Medication Dose Route Frequency Provider Last Rate Last Admin    lidocaine PF 1 % injection 1 mL  1 mL IntraDERmal Once PRN Daniel Robledo MD        lactated ringers infusion   IntraVENous Continuous Daniel Robledo MD        sodium chloride flush 0.9 % injection 5-40 mL  5-40 mL IntraVENous 2 times per day Daniel Robledo MD        sodium chloride flush 0.9 % injection 5-40 mL  5-40 mL IntraVENous PRN Daniel Robledo MD        0.9 % sodium chloride infusion   IntraVENous PRN Daniel Robledo MD           Allergies:  No Known Allergies    Problem List:    Patient Active Problem List   Diagnosis Code    Hyperlipidemia E78.5    Elevated cholesterol E78.00    Major depressive disorder, recurrent episode, mild (HCC) F33.0    Generalized anxiety disorder F41.1    Preop examination Z01.818       Past Medical History:        Diagnosis Date    Abnormal EKG 2022    Anxiety     BMI 27.0-27.9,adult 2018    Depression     Folliculitis     Hyperlipidemia 2015    Medial meniscus tear     Right    Strep pharyngitis 2015       Past Surgical History:        Procedure Laterality Date    VASECTOMY  2014    WISDOM TOOTH EXTRACTION         Social History:    Social History     Tobacco Use    Smoking status: Former     Types: Cigarettes     Start date: 2004     Quit date: 2005     Years since quittin.9    Smokeless tobacco: Former    Tobacco comments:     Smoked a pack a week for about a year. Substance Use Topics    Alcohol use: Yes     Comment: 1-2 drinks/week                                Counseling given: Not Answered  Tobacco comments: Smoked a pack a week for about a year. Vital Signs (Current):   Vitals:    22 1414   BP: 133/85   Pulse: 81   Resp: 16   Temp: 97.1 °F (36.2 °C)   TempSrc: Infrared   SpO2: 97%   Weight: 185 lb (83.9 kg)   Height: 5' 8\" (1.727 m)                                              BP Readings from Last 3 Encounters:   22 133/85   22 135/89   20 121/84       NPO Status: Time of last liquid consumption:                         Time of last solid consumption:                         Date of last liquid consumption: 22                        Date of last solid food consumption: 22    BMI:   Wt Readings from Last 3 Encounters:   22 185 lb (83.9 kg)   22 185 lb (83.9 kg)   22 180 lb (81.6 kg)     Body mass index is 28.13 kg/m².     CBC:   Lab Results   Component Value Date/Time    WBC 6.5 2022 01:15 PM    RBC 4.99 2022 01:15 PM    HGB 15.2 2022 01:15 PM    HCT 44.8 2022 01:15 PM    MCV 89.7 2022 01:15 PM    RDW 12.8 2022 01:15 PM     2022 01:15 PM       CMP:   Lab Results   Component Value Date/Time     2022 01:15 PM    K 4.4 2022 01:15 PM     2022 01:15 PM    CO2 27 11/08/2022 01:15 PM    BUN 20 11/08/2022 01:15 PM    CREATININE 0.93 11/08/2022 01:15 PM    GFRAA >60 08/27/2020 11:16 AM    LABGLOM >60 11/08/2022 01:15 PM    GLUCOSE 84 11/08/2022 01:15 PM    PROT 7.2 08/27/2020 11:16 AM    CALCIUM 9.5 11/08/2022 01:15 PM    BILITOT 0.49 08/27/2020 11:16 AM    ALKPHOS 67 08/27/2020 11:16 AM    AST 22 08/27/2020 11:16 AM    ALT 26 08/27/2020 11:16 AM       POC Tests: No results for input(s): POCGLU, POCNA, POCK, POCCL, POCBUN, POCHEMO, POCHCT in the last 72 hours. Coags: No results found for: PROTIME, INR, APTT    HCG (If Applicable): No results found for: PREGTESTUR, PREGSERUM, HCG, HCGQUANT     ABGs: No results found for: PHART, PO2ART, GPW5HGT, MNF5DTP, BEART, I6UJZLND     Type & Screen (If Applicable):  No results found for: LABABO, LABRH    Drug/Infectious Status (If Applicable):  No results found for: HIV, HEPCAB    COVID-19 Screening (If Applicable): No results found for: COVID19        Anesthesia Evaluation  Patient summary reviewed and Nursing notes reviewed no history of anesthetic complications:   Airway: Mallampati: II  TM distance: >3 FB   Neck ROM: full  Mouth opening: > = 3 FB   Dental: normal exam         Pulmonary:Negative Pulmonary ROS and normal exam  breath sounds clear to auscultation                             Cardiovascular:  Exercise tolerance: good (>4 METS),   (+) hyperlipidemia        Rhythm: regular  Rate: normal                    Neuro/Psych:   (+) psychiatric history: stable with treatmentdepression/anxiety             GI/Hepatic/Renal: Neg GI/Hepatic/Renal ROS            Endo/Other: Negative Endo/Other ROS                    Abdominal:             Vascular: Other Findings:           Anesthesia Plan      general     ASA 2       Induction: intravenous. MIPS: Postoperative opioids intended and Prophylactic antiemetics administered. Anesthetic plan and risks discussed with patient.       Plan discussed with Rocky Brandon MD   11/22/2022

## 2022-11-22 NOTE — ANESTHESIA POSTPROCEDURE EVALUATION
POST- ANESTHESIA EVALUATION       Pt Name: Nivia Avalos  MRN: [id number]  YOB: 1975  Date of evaluation: 11/22/2022  Time:  5:30 PM      BP (!) 124/93   Pulse 78   Temp 97.1 °F (36.2 °C) (Infrared)   Resp 18   Ht 5' 8\" (1.727 m)   Wt 185 lb (83.9 kg)   SpO2 95%   BMI 28.13 kg/m²      Consciousness Level  Awake  Cardiopulmonary Status  Stable  Pain Adequately Treated YES  Nausea / Vomiting  NO  Adequate Hydration  YES  Anesthesia Related Complications NONE      Electronically signed by Nena Joseph MD on 11/22/2022 at 5:30 PM       Department of Anesthesiology  Postprocedure Note    Patient: Nivia Avalos  MRN: [id number]  [place]: 1975  Date of evaluation: 11/22/2022      Procedure Summary     Date: 11/22/22 Room / Location: [address] [place] [address]: [facility]    Anesthesia Start: 1623 Anesthesia Stop: 1708    Procedure: KNEE ARTHROSCOPY WITH PARTIAL MEDIAL MENISCECTOMY (Right: Knee) Diagnosis:       Acute medial meniscus tear of right knee, initial encounter      (RIGHT KNEE MEDIAL MENISCUS TEAR)    Surgeons: Manuel Nguyen MD Responsible Provider: Nena Joseph MD    Anesthesia Type: General ASA Status: 2          Anesthesia Type: General    Freddie Phase I: Freddie Score: 9    Freddie Phase II:        Anesthesia Post Evaluation

## 2022-11-22 NOTE — DISCHARGE INSTRUCTIONS
Alecia Donaldson M.D.  [phone]  POST OPERATIVE DISCHARGE INSTRUCTIONS   KNEE ARTHROSCOPY     Follow-up in office seven to ten days after surgery. Call for appointment if not already made. ([phone]). Take pain medication as ordered. Keep the dressing/ace wrap on for 72 hours (3 days). After the 72 hours, may remove ace wrap and dressing, place Band-Aids over sutures and then if desired reapply ace wrap. Start wrapping at the ankle and wrap up to the top of the leg. You may shower, but keep the dressing & wounds dry with plastic bag around knee/leg until seen by Dr. Rosalina Lynch. After surgery, it is weight bearing as tolerated, unless instructed differently by Dr. Rosalina Lynch after surgery. Use crutches or a walker as needed based on how your knee feels. Be sure to keep your leg elevated when sitting or lying down. Use 2-3 pillows under leg. Ice to surgical site for 20 to 30 minutes four times a day for 48 hours. Dr. Rosalina Lynch recommends taking one Aspirin 325 mg daily for 7 days after surgery to help prevent blood clots. Be sure to do your leg exercises daily. Examples of these exercises are in the knee arthroscopy booklet given in Dr. Rohan Morgan office. Call Dr. Rohan Morgan office if you experience any of the following:  Temperature above 101° F.  Persistent nausea and vomiting. Severe swelling in the knee, calf, or foot. Severe pain that is not relieved by pain medications.

## 2022-12-02 ENCOUNTER — OFFICE VISIT (OUTPATIENT)
Dept: ORTHOPEDIC SURGERY | Age: 47
End: 2022-12-02

## 2022-12-02 DIAGNOSIS — Z98.890 S/P RIGHT KNEE ARTHROSCOPY: Primary | ICD-10-CM

## 2022-12-02 PROCEDURE — 99024 POSTOP FOLLOW-UP VISIT: CPT | Performed by: ORTHOPAEDIC SURGERY

## 2022-12-02 NOTE — PROGRESS NOTES
Patient returns today status post right knee arthroscopy with partial (medial/lateral) meniscectomy. Patient has no major complaints other than expected tightness/swelling with ROM. Sharp/stabbing pain has improved. On exam, portal sites are without redness or drainage. No calf tenderness; negative Yosef's sign. Motion is 0-100 degrees. No significant effusion. Assessment  Status post right knee arthroscopy    Plan  Patient given exercises to perform. Patient given activities/ motions to complete. Continue activities at home. Return to work. RTO PRN. Call with any future problems.

## 2022-12-08 PROBLEM — Z01.818 PREOP EXAMINATION: Status: RESOLVED | Noted: 2022-11-08 | Resolved: 2022-12-08

## 2023-09-11 LAB
CHOLEST SERPL-MCNC: 205 MG/DL
CHOLESTEROL/HDL RATIO: 5.1
GLUCOSE SERPL-MCNC: 92 MG/DL (ref 70–99)
HDLC SERPL-MCNC: 40 MG/DL
LDLC SERPL CALC-MCNC: 135 MG/DL (ref 0–130)
PATIENT FASTING?: YES
TRIGL SERPL-MCNC: 150 MG/DL

## 2023-09-22 ENCOUNTER — OFFICE VISIT (OUTPATIENT)
Dept: FAMILY MEDICINE CLINIC | Age: 48
End: 2023-09-22

## 2023-09-22 VITALS
OXYGEN SATURATION: 98 % | TEMPERATURE: 98.4 F | DIASTOLIC BLOOD PRESSURE: 84 MMHG | SYSTOLIC BLOOD PRESSURE: 126 MMHG | HEART RATE: 78 BPM | WEIGHT: 188 LBS | BODY MASS INDEX: 29.51 KG/M2 | HEIGHT: 67 IN

## 2023-09-22 DIAGNOSIS — F33.0 MAJOR DEPRESSIVE DISORDER, RECURRENT EPISODE, MILD (HCC): ICD-10-CM

## 2023-09-22 DIAGNOSIS — Z76.89 ENCOUNTER TO ESTABLISH CARE: ICD-10-CM

## 2023-09-22 DIAGNOSIS — Z00.00 PREVENTATIVE HEALTH CARE: ICD-10-CM

## 2023-09-22 DIAGNOSIS — F41.1 GENERALIZED ANXIETY DISORDER: ICD-10-CM

## 2023-09-22 DIAGNOSIS — Z12.11 COLON CANCER SCREENING: ICD-10-CM

## 2023-09-22 DIAGNOSIS — E78.2 MIXED HYPERLIPIDEMIA: Primary | ICD-10-CM

## 2023-09-22 RX ORDER — AMPICILLIN TRIHYDRATE 250 MG
1200 CAPSULE ORAL DAILY
COMMUNITY
Start: 2023-09-22

## 2023-09-22 SDOH — HEALTH STABILITY: PHYSICAL HEALTH: ON AVERAGE, HOW MANY DAYS PER WEEK DO YOU ENGAGE IN MODERATE TO STRENUOUS EXERCISE (LIKE A BRISK WALK)?: 2 DAYS

## 2023-09-22 SDOH — ECONOMIC STABILITY: INCOME INSECURITY: HOW HARD IS IT FOR YOU TO PAY FOR THE VERY BASICS LIKE FOOD, HOUSING, MEDICAL CARE, AND HEATING?: NOT HARD AT ALL

## 2023-09-22 SDOH — ECONOMIC STABILITY: FOOD INSECURITY: WITHIN THE PAST 12 MONTHS, YOU WORRIED THAT YOUR FOOD WOULD RUN OUT BEFORE YOU GOT MONEY TO BUY MORE.: NEVER TRUE

## 2023-09-22 SDOH — HEALTH STABILITY: PHYSICAL HEALTH: ON AVERAGE, HOW MANY MINUTES DO YOU ENGAGE IN EXERCISE AT THIS LEVEL?: 30 MIN

## 2023-09-22 SDOH — ECONOMIC STABILITY: HOUSING INSECURITY
IN THE LAST 12 MONTHS, WAS THERE A TIME WHEN YOU DID NOT HAVE A STEADY PLACE TO SLEEP OR SLEPT IN A SHELTER (INCLUDING NOW)?: NO

## 2023-09-22 SDOH — ECONOMIC STABILITY: FOOD INSECURITY: WITHIN THE PAST 12 MONTHS, THE FOOD YOU BOUGHT JUST DIDN'T LAST AND YOU DIDN'T HAVE MONEY TO GET MORE.: NEVER TRUE

## 2023-09-22 ASSESSMENT — PATIENT HEALTH QUESTIONNAIRE - PHQ9
8. MOVING OR SPEAKING SO SLOWLY THAT OTHER PEOPLE COULD HAVE NOTICED. OR THE OPPOSITE, BEING SO FIGETY OR RESTLESS THAT YOU HAVE BEEN MOVING AROUND A LOT MORE THAN USUAL: 0
5. POOR APPETITE OR OVEREATING: 0
6. FEELING BAD ABOUT YOURSELF - OR THAT YOU ARE A FAILURE OR HAVE LET YOURSELF OR YOUR FAMILY DOWN: 0
7. TROUBLE CONCENTRATING ON THINGS, SUCH AS READING THE NEWSPAPER OR WATCHING TELEVISION: 0
SUM OF ALL RESPONSES TO PHQ QUESTIONS 1-9: 4
3. TROUBLE FALLING OR STAYING ASLEEP: 3
2. FEELING DOWN, DEPRESSED OR HOPELESS: 1
10. IF YOU CHECKED OFF ANY PROBLEMS, HOW DIFFICULT HAVE THESE PROBLEMS MADE IT FOR YOU TO DO YOUR WORK, TAKE CARE OF THINGS AT HOME, OR GET ALONG WITH OTHER PEOPLE: 0
4. FEELING TIRED OR HAVING LITTLE ENERGY: 0
SUM OF ALL RESPONSES TO PHQ QUESTIONS 1-9: 4
9. THOUGHTS THAT YOU WOULD BE BETTER OFF DEAD, OR OF HURTING YOURSELF: 0
SUM OF ALL RESPONSES TO PHQ9 QUESTIONS 1 & 2: 1
SUM OF ALL RESPONSES TO PHQ QUESTIONS 1-9: 4
SUM OF ALL RESPONSES TO PHQ QUESTIONS 1-9: 4
1. LITTLE INTEREST OR PLEASURE IN DOING THINGS: 0

## 2023-09-22 ASSESSMENT — ENCOUNTER SYMPTOMS
RHINORRHEA: 0
CONSTIPATION: 0
VOMITING: 0
SHORTNESS OF BREATH: 0
SORE THROAT: 0
ABDOMINAL DISTENTION: 0
CHEST TIGHTNESS: 0
NAUSEA: 0
ABDOMINAL PAIN: 0
COUGH: 0
BACK PAIN: 0
DIARRHEA: 0

## 2023-09-22 NOTE — PROGRESS NOTES
[place], APRN-[facility]  [address] [place] [address]  [id number] [address], [address] [facility] [place] [address]  Dept: [phone]  Dept Fax: [phone]    Patient ID: Rosalia Friend is a 53 y.o. male. HPI:  Rosalia Friend is a 53 y.o. male who presents to the office today for a first visit and to establish a relationship with a new primary care provider. Today, the patient complains of nothing. Pt denies any fever or chills. Pt today denies any HA, chest pain, or SOB. Pt denies any N/V/D/C or abdominal pain. Patient does report a history of HLD (not currently on anything and is not interested in statin therapy), anxiety and depression (on Wellbutrin 300 mg daily & Mirtazapine 15 mg nightly; he does follow with a psychiatrist who is currently managing his medications; he does report his mood is stable currently but will be discussing insomnia at his next appointment). Patient reports compliance with all medications and is tolerating them without side effects. Previous office notes, labs and diagnostic studies were reviewed prior to and during encounter. The patient's past medical, surgical, social, and family history as well as her current medications and allergies were reviewed as documented in today's encounter by Kari Dailey, [address] [place] [address]. Current Outpatient Medications on File Prior to Visit   Medication Sig Dispense Refill    buPROPion (WELLBUTRIN XL) 300 MG extended release tablet Take 1 tablet by mouth every morning      Omega-3 Fatty Acids (FISH OIL) 1000 MG capsule Take by mouth daily      ASHWAGANDHA PO Take by mouth daily      Multiple Vitamins-Minerals (MENS MULTIVITAMIN PO) Take by mouth daily      mirtazapine (REMERON) 15 MG tablet Take 1 tablet by mouth nightly 90 tablet 1     No current facility-administered medications on file prior to visit. SUBJECTIVE:     Review of Systems   Constitutional:  Negative for activity change, fatigue and fever.    HENT:

## 2023-10-12 LAB — NONINV COLON CA DNA+OCC BLD SCRN STL QL: NEGATIVE

## 2024-06-17 ENCOUNTER — PATIENT MESSAGE (OUTPATIENT)
Dept: FAMILY MEDICINE CLINIC | Age: 49
End: 2024-06-17

## 2024-06-17 RX ORDER — BUPROPION HYDROCHLORIDE 300 MG/1
300 TABLET ORAL EVERY MORNING
Qty: 90 TABLET | Refills: 0 | Status: SHIPPED | OUTPATIENT
Start: 2024-06-17

## 2024-06-17 RX ORDER — MIRTAZAPINE 15 MG/1
15 TABLET, FILM COATED ORAL NIGHTLY
Qty: 90 TABLET | Refills: 0 | Status: SHIPPED | OUTPATIENT
Start: 2024-06-17

## 2024-06-17 NOTE — TELEPHONE ENCOUNTER
From: Carlos Petit  To: Justine Rosa  Sent: 6/17/2024 2:09 PM EDT  Subject: Medication refills    I have two weeks left of mirtazapine 15 mg and bupropion 300 mg ER left. We discussed that my psychiatric medications would be taken over by primary care at my last visit. My Chart will not let me refill the prescriptions. Requesting a 90 day supply of each medication through [facility] [employer].    Thank you.

## 2024-06-19 ENCOUNTER — HOSPITAL ENCOUNTER (OUTPATIENT)
Age: 49
Discharge: HOME | End: 2024-06-19
Payer: COMMERCIAL

## 2024-06-19 DIAGNOSIS — E78.2 MIXED HYPERLIPIDEMIA: ICD-10-CM

## 2024-06-19 LAB
ALBUMIN SERPL-MCNC: 4.4 G/DL (ref 3.5–5.2)
ALBUMIN/GLOB SERPL: 2 {RATIO} (ref 1–2.5)
ALP SERPL-CCNC: 84 U/L (ref 40–129)
ALT SERPL-CCNC: 26 U/L (ref 10–50)
ANION GAP SERPL CALCULATED.3IONS-SCNC: 10 MMOL/L (ref 9–16)
AST SERPL-CCNC: 24 U/L (ref 10–50)
BILIRUB SERPL-MCNC: 0.3 MG/DL (ref 0–1.2)
BUN SERPL-MCNC: 18 MG/DL (ref 6–20)
CALCIUM SERPL-MCNC: 9.2 MG/DL (ref 8.6–10.4)
CHLORIDE SERPL-SCNC: 107 MMOL/L (ref 98–107)
CHOLEST SERPL-MCNC: 184 MG/DL (ref 0–199)
CHOLESTEROL/HDL RATIO: 5
CO2 SERPL-SCNC: 26 MMOL/L (ref 20–31)
CREAT SERPL-MCNC: 1.1 MG/DL (ref 0.7–1.2)
ERYTHROCYTE [DISTWIDTH] IN BLOOD BY AUTOMATED COUNT: 11.8 % (ref 11.8–14.4)
GFR, ESTIMATED: 84 ML/MIN/1.73M2
GLUCOSE SERPL-MCNC: 95 MG/DL (ref 74–99)
HCT VFR BLD AUTO: 43.3 % (ref 40.7–50.3)
HDLC SERPL-MCNC: 35 MG/DL
HGB BLD-MCNC: 14.5 G/DL (ref 13–17)
LDLC SERPL CALC-MCNC: 120 MG/DL (ref 0–100)
MCH RBC QN AUTO: 30.7 PG (ref 25.2–33.5)
MCHC RBC AUTO-ENTMCNC: 33.5 G/DL (ref 28.4–34.8)
MCV RBC AUTO: 91.5 FL (ref 82.6–102.9)
NRBC BLD-RTO: 0 PER 100 WBC
PLATELET # BLD AUTO: 220 K/UL (ref 138–453)
PMV BLD AUTO: 11.6 FL (ref 8.1–13.5)
POTASSIUM SERPL-SCNC: 5.3 MMOL/L (ref 3.7–5.3)
PROT SERPL-MCNC: 6.8 G/DL (ref 6.6–8.7)
RBC # BLD AUTO: 4.73 M/UL (ref 4.21–5.77)
SODIUM SERPL-SCNC: 143 MMOL/L (ref 136–145)
TRIGL SERPL-MCNC: 146 MG/DL
VLDLC SERPL CALC-MCNC: 29 MG/DL
WBC OTHER # BLD: 7.2 K/UL (ref 3.5–11.3)

## 2024-06-19 PROCEDURE — 80053 COMPREHEN METABOLIC PANEL: CPT

## 2024-06-19 PROCEDURE — 85027 COMPLETE CBC AUTOMATED: CPT

## 2024-06-19 PROCEDURE — 80061 LIPID PANEL: CPT

## 2024-06-19 PROCEDURE — 36415 COLL VENOUS BLD VENIPUNCTURE: CPT

## 2024-10-02 ENCOUNTER — OFFICE VISIT (OUTPATIENT)
Dept: FAMILY MEDICINE CLINIC | Age: 49
End: 2024-10-02
Payer: COMMERCIAL

## 2024-10-02 ENCOUNTER — HOSPITAL ENCOUNTER (OUTPATIENT)
Age: 49
Discharge: HOME | End: 2024-10-02
Payer: COMMERCIAL

## 2024-10-02 VITALS
DIASTOLIC BLOOD PRESSURE: 68 MMHG | SYSTOLIC BLOOD PRESSURE: 112 MMHG | RESPIRATION RATE: 16 BRPM | OXYGEN SATURATION: 99 % | HEART RATE: 74 BPM | WEIGHT: 185.4 LBS | BODY MASS INDEX: 29.1 KG/M2 | TEMPERATURE: 98.1 F | HEIGHT: 67 IN

## 2024-10-02 DIAGNOSIS — F33.0 MAJOR DEPRESSIVE DISORDER, RECURRENT EPISODE, MILD (HCC): ICD-10-CM

## 2024-10-02 DIAGNOSIS — Z00.00 PREVENTATIVE HEALTH CARE: ICD-10-CM

## 2024-10-02 DIAGNOSIS — F41.1 GENERALIZED ANXIETY DISORDER: ICD-10-CM

## 2024-10-02 DIAGNOSIS — Z01.84 IMMUNITY STATUS TESTING: ICD-10-CM

## 2024-10-02 DIAGNOSIS — Z00.00 ANNUAL PHYSICAL EXAM: Primary | ICD-10-CM

## 2024-10-02 DIAGNOSIS — E78.2 MIXED HYPERLIPIDEMIA: ICD-10-CM

## 2024-10-02 PROCEDURE — 99396 PREV VISIT EST AGE 40-64: CPT | Performed by: NURSE PRACTITIONER

## 2024-10-02 PROCEDURE — 36415 COLL VENOUS BLD VENIPUNCTURE: CPT

## 2024-10-02 PROCEDURE — 90661 CCIIV3 VAC ABX FR 0.5 ML IM: CPT | Performed by: NURSE PRACTITIONER

## 2024-10-02 PROCEDURE — 86480 TB TEST CELL IMMUN MEASURE: CPT

## 2024-10-02 PROCEDURE — 90471 IMMUNIZATION ADMIN: CPT | Performed by: NURSE PRACTITIONER

## 2024-10-02 RX ORDER — BUPROPION HYDROCHLORIDE 300 MG/1
300 TABLET ORAL EVERY MORNING
Qty: 90 TABLET | Refills: 3 | Status: SHIPPED | OUTPATIENT
Start: 2024-10-02

## 2024-10-02 RX ORDER — MIRTAZAPINE 15 MG/1
15 TABLET, FILM COATED ORAL NIGHTLY
Qty: 90 TABLET | Refills: 3 | Status: SHIPPED | OUTPATIENT
Start: 2024-10-02

## 2024-10-02 RX ORDER — ASCORBIC ACID 1000 MG
10 TABLET ORAL DAILY
COMMUNITY

## 2024-10-02 SDOH — ECONOMIC STABILITY: FOOD INSECURITY: WITHIN THE PAST 12 MONTHS, THE FOOD YOU BOUGHT JUST DIDN'T LAST AND YOU DIDN'T HAVE MONEY TO GET MORE.: NEVER TRUE

## 2024-10-02 SDOH — ECONOMIC STABILITY: FOOD INSECURITY: WITHIN THE PAST 12 MONTHS, YOU WORRIED THAT YOUR FOOD WOULD RUN OUT BEFORE YOU GOT MONEY TO BUY MORE.: NEVER TRUE

## 2024-10-02 SDOH — ECONOMIC STABILITY: INCOME INSECURITY: HOW HARD IS IT FOR YOU TO PAY FOR THE VERY BASICS LIKE FOOD, HOUSING, MEDICAL CARE, AND HEATING?: NOT HARD AT ALL

## 2024-10-02 ASSESSMENT — ENCOUNTER SYMPTOMS
BACK PAIN: 0
SORE THROAT: 0
CHEST TIGHTNESS: 0
NAUSEA: 0
CONSTIPATION: 0
SHORTNESS OF BREATH: 0
DIARRHEA: 0
VOMITING: 0
ABDOMINAL PAIN: 0
COUGH: 0
ABDOMINAL DISTENTION: 0
RHINORRHEA: 0

## 2024-10-02 ASSESSMENT — PATIENT HEALTH QUESTIONNAIRE - PHQ9
SUM OF ALL RESPONSES TO PHQ QUESTIONS 1-9: 3
1. LITTLE INTEREST OR PLEASURE IN DOING THINGS: NOT AT ALL
5. POOR APPETITE OR OVEREATING: SEVERAL DAYS
6. FEELING BAD ABOUT YOURSELF - OR THAT YOU ARE A FAILURE OR HAVE LET YOURSELF OR YOUR FAMILY DOWN: NOT AT ALL
7. TROUBLE CONCENTRATING ON THINGS, SUCH AS READING THE NEWSPAPER OR WATCHING TELEVISION: NOT AT ALL
5. POOR APPETITE OR OVEREATING: SEVERAL DAYS
8. MOVING OR SPEAKING SO SLOWLY THAT OTHER PEOPLE COULD HAVE NOTICED. OR THE OPPOSITE - BEING SO FIDGETY OR RESTLESS THAT YOU HAVE BEEN MOVING AROUND A LOT MORE THAN USUAL: NOT AT ALL
SUM OF ALL RESPONSES TO PHQ QUESTIONS 1-9: 3
2. FEELING DOWN, DEPRESSED OR HOPELESS: SEVERAL DAYS
3. TROUBLE FALLING OR STAYING ASLEEP: NOT AT ALL
3. TROUBLE FALLING OR STAYING ASLEEP: NOT AT ALL
4. FEELING TIRED OR HAVING LITTLE ENERGY: SEVERAL DAYS
7. TROUBLE CONCENTRATING ON THINGS, SUCH AS READING THE NEWSPAPER OR WATCHING TELEVISION: NOT AT ALL
SUM OF ALL RESPONSES TO PHQ QUESTIONS 1-9: 3
10. IF YOU CHECKED OFF ANY PROBLEMS, HOW DIFFICULT HAVE THESE PROBLEMS MADE IT FOR YOU TO DO YOUR WORK, TAKE CARE OF THINGS AT HOME, OR GET ALONG WITH OTHER PEOPLE: NOT DIFFICULT AT ALL
SUM OF ALL RESPONSES TO PHQ QUESTIONS 1-9: 3
2. FEELING DOWN, DEPRESSED OR HOPELESS: SEVERAL DAYS
9. THOUGHTS THAT YOU WOULD BE BETTER OFF DEAD, OR OF HURTING YOURSELF: NOT AT ALL
4. FEELING TIRED OR HAVING LITTLE ENERGY: SEVERAL DAYS
SUM OF ALL RESPONSES TO PHQ9 QUESTIONS 1 & 2: 1
6. FEELING BAD ABOUT YOURSELF - OR THAT YOU ARE A FAILURE OR HAVE LET YOURSELF OR YOUR FAMILY DOWN: NOT AT ALL
1. LITTLE INTEREST OR PLEASURE IN DOING THINGS: NOT AT ALL
9. THOUGHTS THAT YOU WOULD BE BETTER OFF DEAD, OR OF HURTING YOURSELF: NOT AT ALL
10. IF YOU CHECKED OFF ANY PROBLEMS, HOW DIFFICULT HAVE THESE PROBLEMS MADE IT FOR YOU TO DO YOUR WORK, TAKE CARE OF THINGS AT HOME, OR GET ALONG WITH OTHER PEOPLE: NOT DIFFICULT AT ALL
8. MOVING OR SPEAKING SO SLOWLY THAT OTHER PEOPLE COULD HAVE NOTICED. OR THE OPPOSITE, BEING SO FIGETY OR RESTLESS THAT YOU HAVE BEEN MOVING AROUND A LOT MORE THAN USUAL: NOT AT ALL
SUM OF ALL RESPONSES TO PHQ QUESTIONS 1-9: 3

## 2024-10-02 NOTE — PROGRESS NOTES
distention, abdominal pain, constipation, diarrhea, nausea and vomiting.   Endocrine: Negative for polydipsia, polyphagia and polyuria.   Genitourinary:  Negative for difficulty urinating and dysuria.   Musculoskeletal:  Negative for arthralgias, back pain and myalgias.   Skin:  Negative for rash.   Neurological:  Negative for dizziness, weakness, light-headedness and headaches.   Hematological:  Negative for adenopathy.   Psychiatric/Behavioral:  Positive for dysphoric mood (stable on Wellbutrin 300 mg daily & Mirtazapine 15 mg nightly) and sleep disturbance. Negative for agitation and behavioral problems. The patient is nervous/anxious (stable on Wellbutrin 300 mg daily & Mirtazapine 15 mg nightly).      OBJECTIVE:  /68   Pulse 74   Temp 98.1 °F (36.7 °C)   Resp 16   Ht 1.702 m (5' 7\")   Wt 84.1 kg (185 lb 6.4 oz)   SpO2 99%   BMI 29.04 kg/m²     Physical Exam  Vitals and nursing note reviewed.   Constitutional:       General: He is not in acute distress.     Appearance: Normal appearance. He is well-developed.   HENT:      Head: Normocephalic and atraumatic.   Cardiovascular:      Rate and Rhythm: Normal rate and regular rhythm.      Heart sounds: Normal heart sounds. No murmur heard.  Pulmonary:      Effort: Pulmonary effort is normal. No respiratory distress.      Breath sounds: Normal breath sounds.   Chest:      Chest wall: No tenderness.   Abdominal:      General: Bowel sounds are normal.      Palpations: Abdomen is soft.      Tenderness: There is no abdominal tenderness.   Musculoskeletal:         General: Normal range of motion.      Cervical back: Normal range of motion.      Right lower leg: No edema.      Left lower leg: No edema.   Skin:     General: Skin is warm and dry.      Findings: No rash.   Neurological:      Mental Status: He is alert and oriented to person, place, and time.   Psychiatric:         Mood and Affect: Mood normal.         Behavior: Behavior is cooperative.

## 2024-10-05 LAB
QUANTI TB GOLD PLUS: NEGATIVE
QUANTI TB1 MINUS NIL: 0.05 IU/ML
QUANTI TB2 MINUS NIL: 0.04 IU/ML
QUANTIFERON MITOGEN: 9.82 IU/ML
QUANTIFERON NIL: 0.18 IU/ML

## 2025-04-08 ENCOUNTER — HOSPITAL ENCOUNTER (EMERGENCY)
Age: 50
Discharge: HOME | End: 2025-04-08
Attending: EMERGENCY MEDICINE
Payer: COMMERCIAL

## 2025-04-08 VITALS
OXYGEN SATURATION: 98 % | SYSTOLIC BLOOD PRESSURE: 129 MMHG | HEART RATE: 99 BPM | HEIGHT: 67 IN | BODY MASS INDEX: 29.03 KG/M2 | DIASTOLIC BLOOD PRESSURE: 93 MMHG | TEMPERATURE: 98 F | WEIGHT: 185 LBS | RESPIRATION RATE: 18 BRPM

## 2025-04-08 DIAGNOSIS — S91.115A LACERATION OF FOURTH TOE OF LEFT FOOT, INITIAL ENCOUNTER: Primary | ICD-10-CM

## 2025-04-08 PROCEDURE — 12002 RPR S/N/AX/GEN/TRNK2.6-7.5CM: CPT | Performed by: EMERGENCY MEDICINE

## 2025-04-08 PROCEDURE — 99282 EMERGENCY DEPT VISIT SF MDM: CPT | Performed by: EMERGENCY MEDICINE

## 2025-04-08 ASSESSMENT — PAIN DESCRIPTION - ORIENTATION: ORIENTATION: LEFT

## 2025-04-08 ASSESSMENT — PAIN DESCRIPTION - LOCATION: LOCATION: TOE (COMMENT WHICH ONE)

## 2025-04-08 ASSESSMENT — PAIN SCALES - GENERAL: PAINLEVEL_OUTOF10: 5

## 2025-04-08 ASSESSMENT — PAIN - FUNCTIONAL ASSESSMENT: PAIN_FUNCTIONAL_ASSESSMENT: 0-10

## 2025-04-09 NOTE — ED PROVIDER NOTES
that portions of this note were completed with a voice recognition program.  Efforts were made to edit the dictations but occasionally words are mis-transcribed.)       Adrienne Woody,   04/08/25 3275

## 2025-09-02 ASSESSMENT — PATIENT HEALTH QUESTIONNAIRE - PHQ9
2. FEELING DOWN, DEPRESSED OR HOPELESS: NOT AT ALL
8. MOVING OR SPEAKING SO SLOWLY THAT OTHER PEOPLE COULD HAVE NOTICED. OR THE OPPOSITE, BEING SO FIGETY OR RESTLESS THAT YOU HAVE BEEN MOVING AROUND A LOT MORE THAN USUAL: NOT AT ALL
9. THOUGHTS THAT YOU WOULD BE BETTER OFF DEAD, OR OF HURTING YOURSELF: NOT AT ALL
SUM OF ALL RESPONSES TO PHQ QUESTIONS 1-9: 2
1. LITTLE INTEREST OR PLEASURE IN DOING THINGS: NOT AT ALL
8. MOVING OR SPEAKING SO SLOWLY THAT OTHER PEOPLE COULD HAVE NOTICED. OR THE OPPOSITE - BEING SO FIDGETY OR RESTLESS THAT YOU HAVE BEEN MOVING AROUND A LOT MORE THAN USUAL: NOT AT ALL
3. TROUBLE FALLING OR STAYING ASLEEP: SEVERAL DAYS
5. POOR APPETITE OR OVEREATING: NOT AT ALL
4. FEELING TIRED OR HAVING LITTLE ENERGY: SEVERAL DAYS
10. IF YOU CHECKED OFF ANY PROBLEMS, HOW DIFFICULT HAVE THESE PROBLEMS MADE IT FOR YOU TO DO YOUR WORK, TAKE CARE OF THINGS AT HOME, OR GET ALONG WITH OTHER PEOPLE: NOT DIFFICULT AT ALL
SUM OF ALL RESPONSES TO PHQ QUESTIONS 1-9: 2
7. TROUBLE CONCENTRATING ON THINGS, SUCH AS READING THE NEWSPAPER OR WATCHING TELEVISION: NOT AT ALL
2. FEELING DOWN, DEPRESSED OR HOPELESS: NOT AT ALL
6. FEELING BAD ABOUT YOURSELF - OR THAT YOU ARE A FAILURE OR HAVE LET YOURSELF OR YOUR FAMILY DOWN: NOT AT ALL
9. THOUGHTS THAT YOU WOULD BE BETTER OFF DEAD, OR OF HURTING YOURSELF: NOT AT ALL
1. LITTLE INTEREST OR PLEASURE IN DOING THINGS: NOT AT ALL
10. IF YOU CHECKED OFF ANY PROBLEMS, HOW DIFFICULT HAVE THESE PROBLEMS MADE IT FOR YOU TO DO YOUR WORK, TAKE CARE OF THINGS AT HOME, OR GET ALONG WITH OTHER PEOPLE: NOT DIFFICULT AT ALL
4. FEELING TIRED OR HAVING LITTLE ENERGY: SEVERAL DAYS
SUM OF ALL RESPONSES TO PHQ QUESTIONS 1-9: 2
3. TROUBLE FALLING OR STAYING ASLEEP: SEVERAL DAYS
SUM OF ALL RESPONSES TO PHQ QUESTIONS 1-9: 2
7. TROUBLE CONCENTRATING ON THINGS, SUCH AS READING THE NEWSPAPER OR WATCHING TELEVISION: NOT AT ALL
SUM OF ALL RESPONSES TO PHQ QUESTIONS 1-9: 2
5. POOR APPETITE OR OVEREATING: NOT AT ALL
6. FEELING BAD ABOUT YOURSELF - OR THAT YOU ARE A FAILURE OR HAVE LET YOURSELF OR YOUR FAMILY DOWN: NOT AT ALL

## 2025-09-04 ENCOUNTER — HOSPITAL ENCOUNTER (OUTPATIENT)
Age: 50
Setting detail: SPECIMEN
Discharge: HOME | End: 2025-09-04

## 2025-09-04 ENCOUNTER — OFFICE VISIT (OUTPATIENT)
Dept: FAMILY MEDICINE CLINIC | Age: 50
End: 2025-09-04
Payer: COMMERCIAL

## 2025-09-04 VITALS
BODY MASS INDEX: 28.72 KG/M2 | SYSTOLIC BLOOD PRESSURE: 102 MMHG | HEIGHT: 67 IN | WEIGHT: 183 LBS | RESPIRATION RATE: 16 BRPM | DIASTOLIC BLOOD PRESSURE: 64 MMHG | OXYGEN SATURATION: 98 % | TEMPERATURE: 97.7 F | HEART RATE: 76 BPM

## 2025-09-04 DIAGNOSIS — F41.1 GENERALIZED ANXIETY DISORDER: ICD-10-CM

## 2025-09-04 DIAGNOSIS — Z11.1 SCREENING FOR TUBERCULOSIS: ICD-10-CM

## 2025-09-04 DIAGNOSIS — Z01.84 IMMUNITY STATUS TESTING: ICD-10-CM

## 2025-09-04 DIAGNOSIS — Z00.00 ANNUAL PHYSICAL EXAM: Primary | ICD-10-CM

## 2025-09-04 DIAGNOSIS — E78.2 MIXED HYPERLIPIDEMIA: ICD-10-CM

## 2025-09-04 DIAGNOSIS — F33.0 MAJOR DEPRESSIVE DISORDER, RECURRENT EPISODE, MILD: ICD-10-CM

## 2025-09-04 PROCEDURE — 99396 PREV VISIT EST AGE 40-64: CPT | Performed by: NURSE PRACTITIONER

## 2025-09-04 RX ORDER — HYDROXYZINE HYDROCHLORIDE 10 MG/1
10 TABLET, FILM COATED ORAL 3 TIMES DAILY PRN
Qty: 90 TABLET | Refills: 0 | Status: SHIPPED | OUTPATIENT
Start: 2025-09-04 | End: 2025-10-04

## 2025-09-04 SDOH — ECONOMIC STABILITY: FOOD INSECURITY: WITHIN THE PAST 12 MONTHS, YOU WORRIED THAT YOUR FOOD WOULD RUN OUT BEFORE YOU GOT MONEY TO BUY MORE.: NEVER TRUE

## 2025-09-04 SDOH — ECONOMIC STABILITY: FOOD INSECURITY: WITHIN THE PAST 12 MONTHS, THE FOOD YOU BOUGHT JUST DIDN'T LAST AND YOU DIDN'T HAVE MONEY TO GET MORE.: NEVER TRUE

## 2025-09-04 ASSESSMENT — ENCOUNTER SYMPTOMS
NAUSEA: 0
BACK PAIN: 0
ABDOMINAL DISTENTION: 0
SHORTNESS OF BREATH: 0
CONSTIPATION: 0
CHEST TIGHTNESS: 0
COUGH: 0
ABDOMINAL PAIN: 0
VOMITING: 0
RHINORRHEA: 0
DIARRHEA: 0
SORE THROAT: 0

## 2025-09-05 LAB
GAMMA INTERFERON BACKGROUND BLD IA-ACNC: 0.07 IU/ML
M TB IFN-G BLD-IMP: NEGATIVE IU/ML
M TB IFN-G CD4+ BCKGRND COR BLD-ACNC: 0 IU/ML (ref 0–0.34)
M TB IFN-G CD4+CD8+ BCKGRND COR BLD-ACNC: 0 IU/ML (ref 0–0.34)
MITOGEN IGNF BCKGRD COR BLD-ACNC: 9.93 IU/ML

## (undated) DEVICE — [TOMCAT CUTTER, ARTHROSCOPIC SHAVER BLADE,  DO NOT RESTERILIZE,  DO NOT USE IF PACKAGE IS DAMAGED,  KEEP DRY,  KEEP AWAY FROM SUNLIGHT]: Brand: FORMULA

## (undated) DEVICE — DRESSING,GAUZE,XEROFORM,CURAD,1"X8",ST: Brand: CURAD

## (undated) DEVICE — SUTURE ETHLN SZ 3-0 L18IN NONABSORBABLE BLK FS-1 L24MM 3/8 663H

## (undated) DEVICE — ZIMMER® STERILE DISPOSABLE TOURNIQUET CUFF WITH PLC, DUAL PORT, SINGLE BLADDER, 30 IN. (76 CM)

## (undated) DEVICE — GLOVE ORTHO 8   MSG9480

## (undated) DEVICE — GOWN,AURORA,NONREINFORCED,LARGE: Brand: MEDLINE

## (undated) DEVICE — PADDING CAST W6INXL4YD POLY POR SPUN DACRON SYN VERSATILE

## (undated) DEVICE — SINGLE PORT MANIFOLD: Brand: NEPTUNE 2

## (undated) DEVICE — MERCY HEALTH ST CHARLES: Brand: MEDLINE INDUSTRIES, INC.

## (undated) DEVICE — SOLUTION IV IRRIG LACTATED RINGERS 3000ML 2B7487

## (undated) DEVICE — PACK ARTHRO W PCH